# Patient Record
Sex: FEMALE | Race: WHITE | NOT HISPANIC OR LATINO | Employment: UNEMPLOYED | URBAN - NONMETROPOLITAN AREA
[De-identification: names, ages, dates, MRNs, and addresses within clinical notes are randomized per-mention and may not be internally consistent; named-entity substitution may affect disease eponyms.]

---

## 2020-10-06 ENCOUNTER — APPOINTMENT (OUTPATIENT)
Dept: CT IMAGING | Facility: HOSPITAL | Age: 70
End: 2020-10-06

## 2020-10-06 ENCOUNTER — APPOINTMENT (OUTPATIENT)
Dept: GENERAL RADIOLOGY | Facility: HOSPITAL | Age: 70
End: 2020-10-06

## 2020-10-06 ENCOUNTER — HOSPITAL ENCOUNTER (INPATIENT)
Facility: HOSPITAL | Age: 70
LOS: 2 days | Discharge: HOME | End: 2020-10-08
Attending: EMERGENCY MEDICINE | Admitting: UROLOGY

## 2020-10-06 ENCOUNTER — APPOINTMENT (OUTPATIENT)
Dept: ULTRASOUND IMAGING | Facility: HOSPITAL | Age: 70
End: 2020-10-06

## 2020-10-06 ENCOUNTER — HOSPITAL ENCOUNTER (OUTPATIENT)
Facility: HOSPITAL | Age: 70
Setting detail: HOSPITAL OUTPATIENT SURGERY
End: 2020-10-06
Attending: UROLOGY | Admitting: UROLOGY

## 2020-10-06 DIAGNOSIS — R09.02 HYPOXEMIA: ICD-10-CM

## 2020-10-06 DIAGNOSIS — N28.9 RENAL INSUFFICIENCY: ICD-10-CM

## 2020-10-06 DIAGNOSIS — D49.4 BLADDER TUMOR: Primary | ICD-10-CM

## 2020-10-06 DIAGNOSIS — J18.9 PNEUMONIA OF LEFT LOWER LOBE DUE TO INFECTIOUS ORGANISM: ICD-10-CM

## 2020-10-06 DIAGNOSIS — R31.9 HEMATURIA, UNSPECIFIED TYPE: ICD-10-CM

## 2020-10-06 DIAGNOSIS — D64.9 ANEMIA, UNSPECIFIED TYPE: ICD-10-CM

## 2020-10-06 LAB
A-A DO2: 28.5 MMHG (ref 0–300)
ABO GROUP BLD: NORMAL
ABO GROUP BLD: NORMAL
ALBUMIN SERPL-MCNC: 3.68 G/DL (ref 3.5–5.2)
ALBUMIN/GLOB SERPL: 1 G/DL
ALP SERPL-CCNC: 122 U/L (ref 39–117)
ALT SERPL W P-5'-P-CCNC: 14 U/L (ref 1–33)
ANION GAP SERPL CALCULATED.3IONS-SCNC: 10.9 MMOL/L (ref 5–15)
APTT PPP: 34 SECONDS (ref 25.6–35.3)
ARTERIAL PATENCY WRIST A: ABNORMAL
AST SERPL-CCNC: 25 U/L (ref 1–32)
ATMOSPHERIC PRESS: 731 MMHG
BACTERIA UR QL AUTO: ABNORMAL /HPF
BASE EXCESS BLDA CALC-SCNC: 5.7 MMOL/L (ref 0–2)
BASOPHILS # BLD AUTO: 0.02 10*3/MM3 (ref 0–0.2)
BASOPHILS NFR BLD AUTO: 0.2 % (ref 0–1.5)
BDY SITE: ABNORMAL
BILIRUB SERPL-MCNC: 0.2 MG/DL (ref 0–1.2)
BILIRUB UR QL STRIP: ABNORMAL
BLD GP AB SCN SERPL QL: NEGATIVE
BODY TEMPERATURE: 0 C
BUN SERPL-MCNC: 17 MG/DL (ref 8–23)
BUN/CREAT SERPL: 11.7 (ref 7–25)
CALCIUM SPEC-SCNC: 8.9 MG/DL (ref 8.6–10.5)
CHLORIDE SERPL-SCNC: 101 MMOL/L (ref 98–107)
CLARITY UR: ABNORMAL
CO2 BLDA-SCNC: 32.5 MMOL/L (ref 22–33)
CO2 SERPL-SCNC: 29.1 MMOL/L (ref 22–29)
COHGB MFR BLD: 1.3 % (ref 0–5)
COLOR UR: ABNORMAL
CREAT SERPL-MCNC: 1.45 MG/DL (ref 0.57–1)
D-LACTATE SERPL-SCNC: 0.7 MMOL/L (ref 0.5–2)
DEPRECATED RDW RBC AUTO: 48 FL (ref 37–54)
EOSINOPHIL # BLD AUTO: 0.01 10*3/MM3 (ref 0–0.4)
EOSINOPHIL NFR BLD AUTO: 0.1 % (ref 0.3–6.2)
ERYTHROCYTE [DISTWIDTH] IN BLOOD BY AUTOMATED COUNT: 15.6 % (ref 12.3–15.4)
GFR SERPL CREATININE-BSD FRML MDRD: 36 ML/MIN/1.73
GLOBULIN UR ELPH-MCNC: 3.6 GM/DL
GLUCOSE SERPL-MCNC: 170 MG/DL (ref 65–99)
GLUCOSE UR STRIP-MCNC: NEGATIVE MG/DL
HCO3 BLDA-SCNC: 31 MMOL/L (ref 20–26)
HCT VFR BLD AUTO: 32.4 % (ref 34–46.6)
HCT VFR BLD CALC: 27.8 % (ref 38–51)
HGB BLD-MCNC: 9.4 G/DL (ref 12–15.9)
HGB BLDA-MCNC: 9.1 G/DL (ref 13.5–17.5)
HGB UR QL STRIP.AUTO: ABNORMAL
HOLD SPECIMEN: NORMAL
HOLD SPECIMEN: NORMAL
HYALINE CASTS UR QL AUTO: ABNORMAL /LPF
IMM GRANULOCYTES # BLD AUTO: 0.12 10*3/MM3 (ref 0–0.05)
IMM GRANULOCYTES NFR BLD AUTO: 1 % (ref 0–0.5)
INHALED O2 CONCENTRATION: 21 %
INR PPP: 1.05 (ref 0.9–1.1)
KETONES UR QL STRIP: NEGATIVE
LEUKOCYTE ESTERASE UR QL STRIP.AUTO: ABNORMAL
LYMPHOCYTES # BLD AUTO: 1.77 10*3/MM3 (ref 0.7–3.1)
LYMPHOCYTES NFR BLD AUTO: 15 % (ref 19.6–45.3)
Lab: ABNORMAL
MCH RBC QN AUTO: 24.4 PG (ref 26.6–33)
MCHC RBC AUTO-ENTMCNC: 29 G/DL (ref 31.5–35.7)
MCV RBC AUTO: 84.2 FL (ref 79–97)
METHGB BLD QL: 0.4 % (ref 0–3)
MODALITY: ABNORMAL
MONOCYTES # BLD AUTO: 0.77 10*3/MM3 (ref 0.1–0.9)
MONOCYTES NFR BLD AUTO: 6.5 % (ref 5–12)
NEUTROPHILS NFR BLD AUTO: 77.2 % (ref 42.7–76)
NEUTROPHILS NFR BLD AUTO: 9.14 10*3/MM3 (ref 1.7–7)
NITRITE UR QL STRIP: NEGATIVE
NOTE: ABNORMAL
NRBC BLD AUTO-RTO: 0 /100 WBC (ref 0–0.2)
NT-PROBNP SERPL-MCNC: 250.7 PG/ML (ref 0–900)
OXYHGB MFR BLDV: 88.8 % (ref 94–99)
PCO2 BLDA: 48.8 MM HG (ref 35–45)
PCO2 TEMP ADJ BLD: ABNORMAL MM[HG]
PH BLDA: 7.41 PH UNITS (ref 7.35–7.45)
PH UR STRIP.AUTO: 6 [PH] (ref 5–8)
PH, TEMP CORRECTED: ABNORMAL
PLATELET # BLD AUTO: 459 10*3/MM3 (ref 140–450)
PMV BLD AUTO: 9.7 FL (ref 6–12)
PO2 BLDA: 60 MM HG (ref 83–108)
PO2 TEMP ADJ BLD: ABNORMAL MM[HG]
POTASSIUM SERPL-SCNC: 3.9 MMOL/L (ref 3.5–5.2)
PROT SERPL-MCNC: 7.3 G/DL (ref 6–8.5)
PROT UR QL STRIP: ABNORMAL
PROTHROMBIN TIME: 13.5 SECONDS (ref 11.9–14.1)
RBC # BLD AUTO: 3.85 10*6/MM3 (ref 3.77–5.28)
RBC # UR: ABNORMAL /HPF
REF LAB TEST METHOD: ABNORMAL
RH BLD: POSITIVE
RH BLD: POSITIVE
SAO2 % BLDCOA: 90.3 % (ref 94–99)
SARS-COV-2 RDRP RESP QL NAA+PROBE: NOT DETECTED
SODIUM SERPL-SCNC: 141 MMOL/L (ref 136–145)
SP GR UR STRIP: 1.02 (ref 1–1.03)
SQUAMOUS #/AREA URNS HPF: ABNORMAL /HPF
T&S EXPIRATION DATE: NORMAL
TRANS CELLS #/AREA URNS HPF: ABNORMAL /HPF
TROPONIN T SERPL-MCNC: <0.01 NG/ML (ref 0–0.03)
UROBILINOGEN UR QL STRIP: ABNORMAL
VENTILATOR MODE: ABNORMAL
WBC # BLD AUTO: 11.83 10*3/MM3 (ref 3.4–10.8)
WBC UR QL AUTO: ABNORMAL /HPF
WHOLE BLOOD HOLD SPECIMEN: NORMAL
WHOLE BLOOD HOLD SPECIMEN: NORMAL

## 2020-10-06 PROCEDURE — 71250 CT THORAX DX C-: CPT

## 2020-10-06 PROCEDURE — 85730 THROMBOPLASTIN TIME PARTIAL: CPT | Performed by: PHYSICIAN ASSISTANT

## 2020-10-06 PROCEDURE — 71250 CT THORAX DX C-: CPT | Performed by: RADIOLOGY

## 2020-10-06 PROCEDURE — 80053 COMPREHEN METABOLIC PANEL: CPT | Performed by: PHYSICIAN ASSISTANT

## 2020-10-06 PROCEDURE — 36600 WITHDRAWAL OF ARTERIAL BLOOD: CPT

## 2020-10-06 PROCEDURE — 85025 COMPLETE CBC W/AUTO DIFF WBC: CPT | Performed by: PHYSICIAN ASSISTANT

## 2020-10-06 PROCEDURE — 83605 ASSAY OF LACTIC ACID: CPT | Performed by: PHYSICIAN ASSISTANT

## 2020-10-06 PROCEDURE — 87040 BLOOD CULTURE FOR BACTERIA: CPT | Performed by: PHYSICIAN ASSISTANT

## 2020-10-06 PROCEDURE — 99223 1ST HOSP IP/OBS HIGH 75: CPT | Performed by: INTERNAL MEDICINE

## 2020-10-06 PROCEDURE — 71045 X-RAY EXAM CHEST 1 VIEW: CPT

## 2020-10-06 PROCEDURE — 84484 ASSAY OF TROPONIN QUANT: CPT | Performed by: PHYSICIAN ASSISTANT

## 2020-10-06 PROCEDURE — 25010000002 CEFTRIAXONE PER 250 MG: Performed by: PHYSICIAN ASSISTANT

## 2020-10-06 PROCEDURE — 74176 CT ABD & PELVIS W/O CONTRAST: CPT | Performed by: RADIOLOGY

## 2020-10-06 PROCEDURE — 86850 RBC ANTIBODY SCREEN: CPT | Performed by: PHYSICIAN ASSISTANT

## 2020-10-06 PROCEDURE — 71045 X-RAY EXAM CHEST 1 VIEW: CPT | Performed by: RADIOLOGY

## 2020-10-06 PROCEDURE — 81001 URINALYSIS AUTO W/SCOPE: CPT | Performed by: PHYSICIAN ASSISTANT

## 2020-10-06 PROCEDURE — 86900 BLOOD TYPING SEROLOGIC ABO: CPT

## 2020-10-06 PROCEDURE — 83880 ASSAY OF NATRIURETIC PEPTIDE: CPT | Performed by: PHYSICIAN ASSISTANT

## 2020-10-06 PROCEDURE — 76856 US EXAM PELVIC COMPLETE: CPT | Performed by: RADIOLOGY

## 2020-10-06 PROCEDURE — 99285 EMERGENCY DEPT VISIT HI MDM: CPT

## 2020-10-06 PROCEDURE — 25010000002 AZITHROMYCIN PER 500 MG: Performed by: PHYSICIAN ASSISTANT

## 2020-10-06 PROCEDURE — 87899 AGENT NOS ASSAY W/OPTIC: CPT | Performed by: INTERNAL MEDICINE

## 2020-10-06 PROCEDURE — 86900 BLOOD TYPING SEROLOGIC ABO: CPT | Performed by: PHYSICIAN ASSISTANT

## 2020-10-06 PROCEDURE — 93005 ELECTROCARDIOGRAM TRACING: CPT | Performed by: PHYSICIAN ASSISTANT

## 2020-10-06 PROCEDURE — 87635 SARS-COV-2 COVID-19 AMP PRB: CPT | Performed by: PHYSICIAN ASSISTANT

## 2020-10-06 PROCEDURE — 82805 BLOOD GASES W/O2 SATURATION: CPT

## 2020-10-06 PROCEDURE — 86901 BLOOD TYPING SEROLOGIC RH(D): CPT | Performed by: PHYSICIAN ASSISTANT

## 2020-10-06 PROCEDURE — 82375 ASSAY CARBOXYHB QUANT: CPT

## 2020-10-06 PROCEDURE — 76856 US EXAM PELVIC COMPLETE: CPT

## 2020-10-06 PROCEDURE — 83050 HGB METHEMOGLOBIN QUAN: CPT

## 2020-10-06 PROCEDURE — 74176 CT ABD & PELVIS W/O CONTRAST: CPT

## 2020-10-06 PROCEDURE — 86901 BLOOD TYPING SEROLOGIC RH(D): CPT

## 2020-10-06 PROCEDURE — 85610 PROTHROMBIN TIME: CPT | Performed by: PHYSICIAN ASSISTANT

## 2020-10-06 PROCEDURE — 93010 ELECTROCARDIOGRAM REPORT: CPT | Performed by: INTERNAL MEDICINE

## 2020-10-06 RX ORDER — SODIUM CHLORIDE 0.9 % (FLUSH) 0.9 %
10 SYRINGE (ML) INJECTION EVERY 12 HOURS SCHEDULED
Status: DISCONTINUED | OUTPATIENT
Start: 2020-10-06 | End: 2020-10-08 | Stop reason: HOSPADM

## 2020-10-06 RX ORDER — DEXTROAMPHETAMINE SACCHARATE, AMPHETAMINE ASPARTATE, DEXTROAMPHETAMINE SULFATE AND AMPHETAMINE SULFATE 7.5; 7.5; 7.5; 7.5 MG/1; MG/1; MG/1; MG/1
30 TABLET ORAL EVERY MORNING
COMMUNITY

## 2020-10-06 RX ORDER — SODIUM CHLORIDE 0.9 % (FLUSH) 0.9 %
10 SYRINGE (ML) INJECTION AS NEEDED
Status: DISCONTINUED | OUTPATIENT
Start: 2020-10-06 | End: 2020-10-08 | Stop reason: HOSPADM

## 2020-10-06 RX ORDER — ZOLPIDEM TARTRATE 5 MG/1
10 TABLET ORAL NIGHTLY
Status: CANCELLED | OUTPATIENT
Start: 2020-10-06

## 2020-10-06 RX ORDER — GABAPENTIN 100 MG/1
100 CAPSULE ORAL EVERY 12 HOURS SCHEDULED
Status: DISCONTINUED | OUTPATIENT
Start: 2020-10-06 | End: 2020-10-08 | Stop reason: HOSPADM

## 2020-10-06 RX ORDER — ZOLPIDEM TARTRATE 5 MG/1
5 TABLET ORAL NIGHTLY
Status: DISCONTINUED | OUTPATIENT
Start: 2020-10-06 | End: 2020-10-08 | Stop reason: HOSPADM

## 2020-10-06 RX ORDER — BISOPROLOL FUMARATE AND HYDROCHLOROTHIAZIDE 2.5; 6.25 MG/1; MG/1
1 TABLET ORAL DAILY
COMMUNITY

## 2020-10-06 RX ORDER — PREGABALIN 100 MG/1
200 CAPSULE ORAL EVERY 12 HOURS SCHEDULED
Status: CANCELLED | OUTPATIENT
Start: 2020-10-06

## 2020-10-06 RX ORDER — PREGABALIN 200 MG/1
200 CAPSULE ORAL 2 TIMES DAILY
COMMUNITY
End: 2020-10-08 | Stop reason: HOSPADM

## 2020-10-06 RX ORDER — DEXTROAMPHETAMINE SACCHARATE, AMPHETAMINE ASPARTATE, DEXTROAMPHETAMINE SULFATE AND AMPHETAMINE SULFATE 1.25; 1.25; 1.25; 1.25 MG/1; MG/1; MG/1; MG/1
30 TABLET ORAL EVERY MORNING
Status: DISCONTINUED | OUTPATIENT
Start: 2020-10-07 | End: 2020-10-08 | Stop reason: HOSPADM

## 2020-10-06 RX ORDER — ZOLPIDEM TARTRATE 10 MG/1
10 TABLET ORAL NIGHTLY
COMMUNITY

## 2020-10-06 RX ADMIN — AZITHROMYCIN MONOHYDRATE 500 MG: 500 INJECTION, POWDER, LYOPHILIZED, FOR SOLUTION INTRAVENOUS at 13:18

## 2020-10-06 RX ADMIN — ZOLPIDEM TARTRATE 5 MG: 5 TABLET ORAL at 21:47

## 2020-10-06 RX ADMIN — SODIUM CHLORIDE 2 G: 900 INJECTION INTRAVENOUS at 12:04

## 2020-10-06 NOTE — H&P
[facility]    Patient Identification:  Name:  Rosaura Andujar  Age:  70 y.o.  Sex:  female  :  1950  MRN:  [id number]   Visit Number:  [id number]  Room number:  110/10  Admit Date: 10/6/2020   Primary Care Physician:  Leeann Gaytan APRN     Chief complaint:    Chief Complaint   Patient presents with   • Blood in Urine       History of presenting illness:  70 y.o. female with no significant past medical history except of neuropathy, presented to emergency department after being referred from her gynecologist when she was evaluated for vaginal bleed.  The patient apparently has been complaining of hematuria and not a true vaginal bleed discharge and the patient has been having the symptoms for the past 4 months however over the past week it is becoming more persistent, it is painless, sometimes she feels right flank pain.  Apart from that the patient was having some dyspnea on exertion.  At her arrival to emergency department her pulse ox was in the 88% on room air.  The patient has been evaluated by her gynecologist with pelvic ultrasound that was unrevealing, she had a CT scan of the chest and pelvis and abdomen in the ER where she was found to have a bladder tumor causing obstructing in the left ureter causing hydroureter/hydronephrosis as well as infiltrate in the left lung.  She was started on IV antibiotics, consult with urology has been placed by the ER provider.  Medical admission was requested.    ---------------------------------------------------------------------------------------------------------------------   Review of Systems unremarkable except was mentioned above  ---------------------------------------------------------------------------------------------------------------------   History reviewed. No pertinent past medical history.  History reviewed. No pertinent surgical history.  History reviewed. No pertinent family history.  Social  History     Socioeconomic History   • Marital status:      Spouse name: Not on file   • Number of children: Not on file   • Years of education: Not on file   • Highest education level: Not on file   Tobacco Use   • Smoking status: Never Smoker   Substance and Sexual Activity   • Alcohol use: Not Currently   • Drug use: Never     ---------------------------------------------------------------------------------------------------------------------   Allergies:  Sulfa antibiotics and Tetracyclines & related  ---------------------------------------------------------------------------------------------------------------------   Prior to Admission Medications     None        ---------------------------------------------------------------------------------------------------------------------   Vital Signs:  Temp:  [98.9 °F (37.2 °C)] 98.9 °F (37.2 °C)  Heart Rate:  [83-98] 96  Resp:  [20] 20  BP: (106-136)/(55-96) 120/68    Mean Arterial Pressure (Non-Invasive) for the past 24 hrs (Last 3 readings):   Noninvasive MAP (mmHg)   10/06/20 1404 90   10/06/20 1353 97   10/06/20 1233 110     SpO2:  [91 %-98 %] 98 %  on  Flow (L/min):  [2] 2;   Device (Oxygen Therapy): nasal cannula  Body mass index is 29.86 kg/m².    Wt Readings from Last 3 Encounters:   10/06/20 83.9 kg (185 lb)               ---------------------------------------------------------------------------------------------------------------------   Physical Exam:  Constitutional:  Well-developed and well-nourished.  Moderate respiratory distress especially when speaking full sentences   HENT:  Head: Normocephalic and atraumatic.  Mouth:  Moist mucous membranes.    Eyes:  Conjunctivae and EOM are normal.  Pupils are equal, round, and reactive to light.  No scleral icterus.  Neck:  Neck supple.  No JVD present.    Cardiovascular:  Normal rate, regular rhythm and normal heart sounds with no murmur.  Pulmonary/Chest:  N reduced air entry at the bases without any  other added sounds, normal chest expansion.  Abdominal:  Soft.  Bowel sounds are normal.  No distension and no tenderness.   Musculoskeletal:  No edema, no tenderness, and no deformity.  No red or swollen joints anywhere.    Neurological:  Alert and oriented to person, place, and time.  No cranial nerve deficit.  No tongue deviation.  No facial droop.  No slurred speech.   Skin:  Skin is warm and dry.  No rash noted.  No pallor.   Peripheral vascular:  No edema and strong pulses on all 4 extremities.      I have personally looked at both the EKG and the telemetry strips.  --------------------------------------------------------------------------------------------------------------------  Results from last 7 days   Lab Units 10/06/20  0852   TROPONIN T ng/mL <0.010     Results from last 7 days   Lab Units 10/06/20  1159 10/06/20  0852   LACTATE mmol/L 0.7  --    WBC 10*3/mm3  --  11.83*   HEMOGLOBIN g/dL  --  9.4*   HEMATOCRIT %  --  32.4*   MCV fL  --  84.2   MCHC g/dL  --  29.0*   PLATELETS 10*3/mm3  --  459*   INR   --  1.05     Results from last 7 days   Lab Units 10/06/20  0852   SODIUM mmol/L 141   POTASSIUM mmol/L 3.9   CHLORIDE mmol/L 101   CO2 mmol/L 29.1*   BUN mg/dL 17   CREATININE mg/dL 1.45*   EGFR IF NONAFRICN AM mL/min/1.73 36*   CALCIUM mg/dL 8.9   GLUCOSE mg/dL 170*   ALBUMIN g/dL 3.68   BILIRUBIN mg/dL 0.2   ALK PHOS U/L 122*   AST (SGOT) U/L 25   ALT (SGPT) U/L 14   Estimated Creatinine Clearance: 40.5 mL/min (A) (by C-G formula based on SCr of 1.45 mg/dL (H)).    No results found for: HGBA1C, POCGLU  No results found for: AMMONIA    Results from last 7 days   Lab Units 10/06/20  0940   NITRITE UA  Negative   WBC UA /HPF 3-5*   BACTERIA UA /HPF 1+*   SQUAM EPITHEL UA /HPF 0-2     No results found for: BLOODCXNo results found for: RESPCXNo results found for: URINECXNo results found for: WOUNDCXNo results found for: BODYFLDCXNo results found for: STOOLCX  pH pH, Arterial   Date Value Ref Range Status    10/06/2020 7.412 7.350 - 7.450 pH units Final      pO2 pO2, Arterial   Date Value Ref Range Status   10/06/2020 60.0 (L) 83.0 - 108.0 mm Hg Final     Comment:     84 Value below reference range      pCO2 pCO2, Arterial   Date Value Ref Range Status   10/06/2020 48.8 (H) 35.0 - 45.0 mm Hg Final      HCO3 HCO3, Arterial   Date Value Ref Range Status   10/06/2020 31.0 (H) 20.0 - 26.0 mmol/L Final     Comment:     83 Value above reference range        I have personally looked at the labs and they are summarized above.  ----------------------------------------------------------------------------------------------------------------------  Imaging Results (Last 24 Hours)     Procedure Component Value Units Date/Time    CT Abdomen Pelvis Without Contrast [[id number]] Collected: 10/06/20 1125     Updated: 10/06/20 1133    Narrative:      EXAM: CT ABDOMEN PELVIS WO CONTRAST-            TECHNIQUE: Multiple axial CT images were obtained from lung bases  through pubic symphysis WITHOUT administration of IV contrast.  Reformatted images in the coronal and/or sagittal plane(s) were  generated from the axial data set to facilitate diagnostic accuracy  and/or surgical planning.  Oral Contrast:NONE.     Radiation dose reduction techniques were utilized per ALARA protocol.  Automated exposure control was initiated through either or [employer] or  [employer]ose[employer] software packages by  protocol.    DOSE:     Clinical information abd pain, hematuria      Comparison NONE.     FINDINGS:     Lower thorax: Parenchymal nodule in the left lung base on image 4  measuring 9 mm  6 mm parenchymal nodule in the right lung base  Large hiatal hernia     Abdomen:     Liver: Low-attenuation lesion in the caudate lobe of the liver     Gallbladder: Surgically absent     Pancreas: Unremarkable. No mass or ductal dilatation.     Spleen: Homogeneous. No splenomegaly.     Adrenals: No mass.     Kidneys/ureters: Left-sided hydronephrosis and hydroureter  to the level  of the urinary bladder where there appears to be a urinary bladder mass  extending into the distal left ureter.     GI tract: Moderate to large volume stool in the colon     Peritoneum: No free air. No free fluid or loculated fluid collections.     Mesentery: Unremarkable.     Lymph nodes: No lymphadenopathy.     Vasculature: No evidence of aneurysm.     Abdominal wall: No focal hernia or mass.        Pelvis:     Bladder: Abnormal urinary bladder mass which appears to also involve the  distal left ureter.     Reproductive: Unremarkable as visualized.     Appendix: No evidence of appendicitis     Bones: Scoliosis and arthritic change       Impression:         1. Urinary bladder mass and it appears to extend into the distal left  ureter. Recommend urologic follow-up  2. Low-attenuation lesion in the liver and nodules in the lungs. These  are concerning for metastatic disease  3. Other findings as above                 This report was finalized on 10/6/2020 11:29 AM by Dr. Barry Batista MD.       CT Chest Without Contrast [[id number]] Collected: 10/06/20 1129     Updated: 10/06/20 1133    Narrative:      EXAM: CT CHEST WO CONTRAST-            CLINICAL INDICATION:sob      COMPARISON: Chest x-ray 10/06/2020     TECHNIQUE: Multiple axial CT images were obtained from lung apex through  upper abdomen without administration of IV contrast. Reformatted images  in the coronal and/or sagittal plane(s) were generated from the axial  data set to facilitate diagnostic accuracy and/or surgical planning.     Radiation dose reduction techniques were utilized per ALARA protocol.  Automated exposure control was initiated through either or [employer] or  Dose[employer]ig[employer] software packages by  protocol.    DOSE (DLP mGy-cm):        FINDINGS:     Lungs: There are parenchymal nodules bilaterally concerning for  metastatic disease.  In addition, there are few groundglass opacities, predominantly in the  left lung, most  likely concomitant pneumonia  Heart: Unremarkable.  Pericardium: No effusion.  Mediastinum: No masses. No enlarged lymph nodes.  No fluid collections.  Pleura: No pleural effusion. No pleural mass or abnormal calcification.  No pneumothorax.  Major airways: Clear. No intrinsic mass.  Vasculature: No evidence of aneurysm.   Visualized upper abdomen:Large hiatal hernia  Other: None.  Bones: No acute bony abnormality.       Impression:         1. Parenchymal nodules bilaterally concerning for metastatic disease.  Largest nodule is in the left upper lobe and measures 1.59 cm.  2. Few groundglass opacities primarily in the left lung but concerning  for concomitant pneumonia  3. Other findings as above        This report was finalized on 10/6/2020 11:31 AM by Dr. Barry Batista MD.       XR Chest 1 View [[id number]] Collected: 10/06/20 1043     Updated: 10/06/20 1046    Narrative:      XR CHEST 1 VW-     CLINICAL INDICATION: fatigue        COMPARISON: None available      TECHNIQUE: Single frontal view of the chest.     FINDINGS:     Left-sided consolidation concerning for pneumonia  The cardiac silhouette is normal. The pulmonary vasculature is  unremarkable.  There is no evidence of an acute osseous abnormality.   There are no suspicious-appearing parenchymal soft tissue nodules.          Impression:      Left-sided consolidation concerning for pneumonia     This report was finalized on 10/6/2020 10:44 AM by Dr. Barry Batista MD.       US Pelvis Complete [[id number]] Collected: 10/06/20 0940     Updated: 10/06/20 0943    Narrative:      EXAMINATION: US PELVIS COMPLETE-      CLINICAL INDICATION: pain        COMPARISON: None available     FINDINGS: Transabdominal sonographic imaging of the pelvis shows uterus  measuring 9.30 x 3.21 cm.  Endometrium is 5.8 mm.  No complex uterine mass     There is what appears to be a mass within the bladder measuring 4.3 cm.  I would suggest cystoscopy.       Impression:      1. No complex  uterine mass. Right ovary unable to be visualized. Left  ovary unremarkable.  2. Echogenic material within the urinary bladder. Recommend urologic  follow-up      This report was finalized on 10/6/2020 9:41 AM by Dr. Barry Batista MD.           I have personally reviewed the radiology images and read the final radiology report.  ----------------------------------------------------------------------------------------------------------------------        Assessment and Plan:    *Mild hypoxia at initial presentation, down to 88% on room air, suspected due to CAP  *CAP in the left upper lobe, negative COVID-19  *Painless hematuria with bladder tumor suspicious of bladder malignancy with possible metastasis to the liver and lungs  *Hydronephrosis and hydroureter due to above  *Mild leukocytosis due to above  *History of neuropathy, patient takes Lyrica 400 mg a day  *Renal insufficiency, possibly obstructive uropathy due to above    --MedSurg admission  --For the pneumonia we will start the patient on ceftriaxone and azithromycin, will continue the current therapy and follow-up on the culture sent by ER, pneumococcal urine antigen will be checked  --SCDs for DVT prophylaxis  --Urology consult has been obtained in the ER, patient eventually will need cystoscopy and biopsy.   --With current examination and going to hold on IV fluids/diuretics although patient seems to have mild third spacing.  --CBC and BMP monitoring         Mhd Fer Romero MD  10/06/20  14:37 EDT

## 2020-10-06 NOTE — ED PROVIDER NOTES
Subjective   70-year-old female presents secondary to 4-month history of blood in her urine.  She denies any vaginal bleeding.  She states that she is also had some discomfort in her lower abdomen into her vaginal region.  She states this is worse when she standing and walking.  She states that she had not been seen other than urgent care and has not had any type of work-up.  No weight loss.  Patient is eating and drinking well.  She voices no other complaints at this time.          Review of Systems   Constitutional: Negative.  Negative for fever.   HENT: Negative.    Respiratory: Negative.    Cardiovascular: Negative.  Negative for chest pain.   Gastrointestinal: Positive for abdominal pain.   Endocrine: Negative.    Genitourinary: Positive for hematuria. Negative for dysuria and frequency.   Skin: Negative.    Neurological: Negative.    Psychiatric/Behavioral: Negative.    All other systems reviewed and are negative.      History reviewed. No pertinent past medical history.    Allergies   Allergen Reactions   • Sulfa Antibiotics Anaphylaxis   • Tetracyclines & Related Anaphylaxis       History reviewed. No pertinent surgical history.    History reviewed. No pertinent family history.    Social History     Socioeconomic History   • Marital status:      Spouse name: Not on file   • Number of children: Not on file   • Years of education: Not on file   • Highest education level: Not on file   Tobacco Use   • Smoking status: Never Smoker   Substance and Sexual Activity   • Alcohol use: Not Currently   • Drug use: Never           Objective   Physical Exam  Vitals signs and nursing note reviewed.   Constitutional:       General: She is not in acute distress.     Appearance: She is well-developed. She is not diaphoretic.   HENT:      Head: Normocephalic and atraumatic.      Right Ear: External ear normal.      Left Ear: External ear normal.      Nose: Nose normal.   Eyes:      Conjunctiva/sclera: Conjunctivae  normal.      Pupils: Pupils are equal, round, and reactive to light.   Neck:      Musculoskeletal: Normal range of motion and neck supple.      Vascular: No JVD.      Trachea: No tracheal deviation.   Cardiovascular:      Rate and Rhythm: Normal rate and regular rhythm.      Heart sounds: Normal heart sounds. No murmur.   Pulmonary:      Effort: Pulmonary effort is normal. No respiratory distress.      Breath sounds: Normal breath sounds. No wheezing.   Abdominal:      General: Bowel sounds are normal.      Palpations: Abdomen is soft.      Tenderness: There is no abdominal tenderness.   Genitourinary:     General: Normal vulva.      Comments: No vaginal bleeding.  Musculoskeletal: Normal range of motion.         General: No deformity.   Skin:     General: Skin is warm and dry.      Coloration: Skin is not pale.      Findings: No erythema or rash.   Neurological:      Mental Status: She is alert and oriented to person, place, and time.      Cranial Nerves: No cranial nerve deficit.   Psychiatric:         Behavior: Behavior normal.         Thought Content: Thought content normal.         Procedures           ED Course  ED Course as of Oct 06 1408   Tue Oct 06, 2020   1139 D/w Dr Sabillon- NPO after midnight. Will consult.     [JI]   1140 Paged the hospitalist.    [JI]   1205 Accepted by Dr Romero    [JI]      ED Course User Index  [JI] Luis Carlos Moreno PA                                           MDM  Number of Diagnoses or Management Options  Anemia, unspecified type: new and requires workup  Bladder tumor: new and requires workup  Hematuria, unspecified type: new and requires workup  Hypoxemia: new and requires workup  Pneumonia of left lower lobe due to infectious organism: new and requires workup  Renal insufficiency: new and requires workup     Amount and/or Complexity of Data Reviewed  Clinical lab tests: reviewed and ordered  Tests in the radiology section of CPT®: reviewed and ordered  Tests in the medicine  section of CPT®: reviewed and ordered        Final diagnoses:   Bladder tumor   Hematuria, unspecified type   Renal insufficiency   Anemia, unspecified type   Hypoxemia   Pneumonia of left lower lobe due to infectious organism            Luis Carlos Moreno PA  10/06/20 1402

## 2020-10-07 ENCOUNTER — ANESTHESIA EVENT (OUTPATIENT)
Dept: PERIOP | Facility: HOSPITAL | Age: 70
End: 2020-10-07

## 2020-10-07 ENCOUNTER — ANESTHESIA (OUTPATIENT)
Dept: PERIOP | Facility: HOSPITAL | Age: 70
End: 2020-10-07

## 2020-10-07 ENCOUNTER — APPOINTMENT (OUTPATIENT)
Dept: GENERAL RADIOLOGY | Facility: HOSPITAL | Age: 70
End: 2020-10-07

## 2020-10-07 LAB
ANION GAP SERPL CALCULATED.3IONS-SCNC: 10.8 MMOL/L (ref 5–15)
BUN SERPL-MCNC: 15 MG/DL (ref 8–23)
BUN/CREAT SERPL: 12 (ref 7–25)
CALCIUM SPEC-SCNC: 8.4 MG/DL (ref 8.6–10.5)
CHLORIDE SERPL-SCNC: 103 MMOL/L (ref 98–107)
CO2 SERPL-SCNC: 24.2 MMOL/L (ref 22–29)
CREAT SERPL-MCNC: 1.25 MG/DL (ref 0.57–1)
DEPRECATED RDW RBC AUTO: 47.2 FL (ref 37–54)
ERYTHROCYTE [DISTWIDTH] IN BLOOD BY AUTOMATED COUNT: 15.5 % (ref 12.3–15.4)
GFR SERPL CREATININE-BSD FRML MDRD: 43 ML/MIN/1.73
GLUCOSE SERPL-MCNC: 99 MG/DL (ref 65–99)
HCT VFR BLD AUTO: 27 % (ref 34–46.6)
HGB BLD-MCNC: 8.1 G/DL (ref 12–15.9)
IRON 24H UR-MRATE: 19 MCG/DL (ref 37–145)
IRON SATN MFR SERPL: 6 % (ref 20–50)
MCH RBC QN AUTO: 25.1 PG (ref 26.6–33)
MCHC RBC AUTO-ENTMCNC: 30 G/DL (ref 31.5–35.7)
MCV RBC AUTO: 83.6 FL (ref 79–97)
PLATELET # BLD AUTO: 278 10*3/MM3 (ref 140–450)
PMV BLD AUTO: 9.8 FL (ref 6–12)
POTASSIUM SERPL-SCNC: 3.8 MMOL/L (ref 3.5–5.2)
RBC # BLD AUTO: 3.23 10*6/MM3 (ref 3.77–5.28)
S PNEUM AG SPEC QL LA: NEGATIVE
SODIUM SERPL-SCNC: 138 MMOL/L (ref 136–145)
TIBC SERPL-MCNC: 307 MCG/DL (ref 298–536)
TRANSFERRIN SERPL-MCNC: 206 MG/DL (ref 200–360)
WBC # BLD AUTO: 7.69 10*3/MM3 (ref 3.4–10.8)

## 2020-10-07 PROCEDURE — 52235 CYSTOSCOPY AND TREATMENT: CPT | Performed by: UROLOGY

## 2020-10-07 PROCEDURE — 84466 ASSAY OF TRANSFERRIN: CPT | Performed by: INTERNAL MEDICINE

## 2020-10-07 PROCEDURE — 99232 SBSQ HOSP IP/OBS MODERATE 35: CPT | Performed by: INTERNAL MEDICINE

## 2020-10-07 PROCEDURE — 0TBB8ZZ EXCISION OF BLADDER, VIA NATURAL OR ARTIFICIAL OPENING ENDOSCOPIC: ICD-10-PCS | Performed by: UROLOGY

## 2020-10-07 PROCEDURE — 25010000002 GENTAMICIN PER 80 MG: Performed by: UROLOGY

## 2020-10-07 PROCEDURE — 25010000002 FENTANYL CITRATE (PF) 100 MCG/2ML SOLUTION: Performed by: NURSE ANESTHETIST, CERTIFIED REGISTERED

## 2020-10-07 PROCEDURE — 25010000002 PROPOFOL 10 MG/ML EMULSION: Performed by: NURSE ANESTHETIST, CERTIFIED REGISTERED

## 2020-10-07 PROCEDURE — 85027 COMPLETE CBC AUTOMATED: CPT | Performed by: INTERNAL MEDICINE

## 2020-10-07 PROCEDURE — 80048 BASIC METABOLIC PNL TOTAL CA: CPT | Performed by: INTERNAL MEDICINE

## 2020-10-07 PROCEDURE — 88307 TISSUE EXAM BY PATHOLOGIST: CPT | Performed by: UROLOGY

## 2020-10-07 PROCEDURE — 83540 ASSAY OF IRON: CPT | Performed by: INTERNAL MEDICINE

## 2020-10-07 PROCEDURE — 25010000002 AZITHROMYCIN PER 500 MG: Performed by: UROLOGY

## 2020-10-07 RX ORDER — MAGNESIUM HYDROXIDE 1200 MG/15ML
3000 LIQUID ORAL CONTINUOUS
Status: DISCONTINUED | OUTPATIENT
Start: 2020-10-07 | End: 2020-10-08

## 2020-10-07 RX ORDER — MAGNESIUM HYDROXIDE 1200 MG/15ML
LIQUID ORAL AS NEEDED
Status: DISCONTINUED | OUTPATIENT
Start: 2020-10-07 | End: 2020-10-07 | Stop reason: HOSPADM

## 2020-10-07 RX ORDER — FENTANYL CITRATE 50 UG/ML
INJECTION, SOLUTION INTRAMUSCULAR; INTRAVENOUS AS NEEDED
Status: DISCONTINUED | OUTPATIENT
Start: 2020-10-07 | End: 2020-10-07 | Stop reason: SURG

## 2020-10-07 RX ORDER — SODIUM CHLORIDE, SODIUM LACTATE, POTASSIUM CHLORIDE, CALCIUM CHLORIDE 600; 310; 30; 20 MG/100ML; MG/100ML; MG/100ML; MG/100ML
125 INJECTION, SOLUTION INTRAVENOUS CONTINUOUS
Status: DISCONTINUED | OUTPATIENT
Start: 2020-10-07 | End: 2020-10-08

## 2020-10-07 RX ORDER — IPRATROPIUM BROMIDE AND ALBUTEROL SULFATE 2.5; .5 MG/3ML; MG/3ML
3 SOLUTION RESPIRATORY (INHALATION) ONCE AS NEEDED
Status: DISCONTINUED | OUTPATIENT
Start: 2020-10-07 | End: 2020-10-07 | Stop reason: HOSPADM

## 2020-10-07 RX ORDER — SODIUM CHLORIDE 0.9 % (FLUSH) 0.9 %
10 SYRINGE (ML) INJECTION AS NEEDED
Status: DISCONTINUED | OUTPATIENT
Start: 2020-10-07 | End: 2020-10-07 | Stop reason: HOSPADM

## 2020-10-07 RX ORDER — MIDAZOLAM HYDROCHLORIDE 1 MG/ML
1 INJECTION INTRAMUSCULAR; INTRAVENOUS
Status: DISCONTINUED | OUTPATIENT
Start: 2020-10-07 | End: 2020-10-07 | Stop reason: HOSPADM

## 2020-10-07 RX ORDER — OXYCODONE HYDROCHLORIDE AND ACETAMINOPHEN 5; 325 MG/1; MG/1
1 TABLET ORAL ONCE AS NEEDED
Status: DISCONTINUED | OUTPATIENT
Start: 2020-10-07 | End: 2020-10-07 | Stop reason: HOSPADM

## 2020-10-07 RX ORDER — GENTAMICIN SULFATE 80 MG/100ML
80 INJECTION, SOLUTION INTRAVENOUS ONCE
Status: COMPLETED | OUTPATIENT
Start: 2020-10-07 | End: 2020-10-07

## 2020-10-07 RX ORDER — L.ACID,PARA/B.BIFIDUM/S.THERM 8B CELL
1 CAPSULE ORAL DAILY
Status: DISCONTINUED | OUTPATIENT
Start: 2020-10-07 | End: 2020-10-08 | Stop reason: HOSPADM

## 2020-10-07 RX ORDER — ATROPA BELLADONNA AND OPIUM 16.2; 3 MG/1; MG/1
SUPPOSITORY RECTAL AS NEEDED
Status: DISCONTINUED | OUTPATIENT
Start: 2020-10-07 | End: 2020-10-07 | Stop reason: HOSPADM

## 2020-10-07 RX ORDER — FENTANYL CITRATE 50 UG/ML
50 INJECTION, SOLUTION INTRAMUSCULAR; INTRAVENOUS
Status: DISCONTINUED | OUTPATIENT
Start: 2020-10-07 | End: 2020-10-07 | Stop reason: HOSPADM

## 2020-10-07 RX ORDER — MEPERIDINE HYDROCHLORIDE 25 MG/ML
12.5 INJECTION INTRAMUSCULAR; INTRAVENOUS; SUBCUTANEOUS
Status: DISCONTINUED | OUTPATIENT
Start: 2020-10-07 | End: 2020-10-07 | Stop reason: HOSPADM

## 2020-10-07 RX ORDER — ONDANSETRON 2 MG/ML
4 INJECTION INTRAMUSCULAR; INTRAVENOUS AS NEEDED
Status: DISCONTINUED | OUTPATIENT
Start: 2020-10-07 | End: 2020-10-07 | Stop reason: HOSPADM

## 2020-10-07 RX ORDER — PROPOFOL 10 MG/ML
VIAL (ML) INTRAVENOUS AS NEEDED
Status: DISCONTINUED | OUTPATIENT
Start: 2020-10-07 | End: 2020-10-07 | Stop reason: SURG

## 2020-10-07 RX ORDER — SODIUM CHLORIDE 0.9 % (FLUSH) 0.9 %
10 SYRINGE (ML) INJECTION EVERY 12 HOURS SCHEDULED
Status: DISCONTINUED | OUTPATIENT
Start: 2020-10-07 | End: 2020-10-07 | Stop reason: HOSPADM

## 2020-10-07 RX ADMIN — AZITHROMYCIN MONOHYDRATE 500 MG: 500 INJECTION, POWDER, LYOPHILIZED, FOR SOLUTION INTRAVENOUS at 17:26

## 2020-10-07 RX ADMIN — ZOLPIDEM TARTRATE 5 MG: 5 TABLET ORAL at 20:47

## 2020-10-07 RX ADMIN — FENTANYL CITRATE 50 MCG: 50 INJECTION INTRAMUSCULAR; INTRAVENOUS at 12:47

## 2020-10-07 RX ADMIN — GENTAMICIN SULFATE 80 MG: 80 INJECTION, SOLUTION INTRAVENOUS at 12:37

## 2020-10-07 RX ADMIN — SODIUM CHLORIDE, POTASSIUM CHLORIDE, SODIUM LACTATE AND CALCIUM CHLORIDE: 600; 310; 30; 20 INJECTION, SOLUTION INTRAVENOUS at 12:37

## 2020-10-07 RX ADMIN — SODIUM CHLORIDE 3000 ML: 900 IRRIGANT IRRIGATION at 18:17

## 2020-10-07 RX ADMIN — PROPOFOL 150 MG: 10 INJECTION, EMULSION INTRAVENOUS at 12:41

## 2020-10-07 RX ADMIN — FENTANYL CITRATE 50 MCG: 50 INJECTION INTRAMUSCULAR; INTRAVENOUS at 13:56

## 2020-10-07 RX ADMIN — FENTANYL CITRATE 50 MCG: 50 INJECTION INTRAMUSCULAR; INTRAVENOUS at 14:07

## 2020-10-07 RX ADMIN — FENTANYL CITRATE 50 MCG: 50 INJECTION INTRAMUSCULAR; INTRAVENOUS at 13:01

## 2020-10-07 NOTE — CONSULTS
Name:  Rosaura Andujar  :  1950    DATE OF ADMISSION  10/6/2020    DATE OF CONSULT  10/7/2020           PRIMARY CARE PHYSICIAN  Leeann Gaytan, PAULA    REASON FOR CONSULT  Gross hematuria for 4 months    CHIEF COMPLAINT  Chief Complaint   Patient presents with   • Blood in Urine       HISTORY OF PRESENT ILLNESS:   Rosaura Andujar is a 70 y.o. female who has gross painless hematuria for 4 months is seen to nurse practitioners a GYN thought to be vaginal bleeding eventually patient realized it was from her bladder.  Her brother had bladder cancer as well.  She is not smoked for many years she has no other significant risk factors.  She has significant burning she has significant anemia.  She denies other significant medical problems.    I saw Rosaura Andujar in their hospital room this morning.    PAST MEDICAL HISTORY  Past Medical History:   Diagnosis Date   • Cancer (CMS/HCC)    • Hyperlipidemia    • Hypertension        PAST SURGICAL HISTORY  Past Surgical History:   Procedure Laterality Date   • OOPHORECTOMY Right        SOCIAL HISTORY  Social History     Socioeconomic History   • Marital status:      Spouse name: Not on file   • Number of children: Not on file   • Years of education: Not on file   • Highest education level: Not on file   Tobacco Use   • Smoking status: Never Smoker   Substance and Sexual Activity   • Alcohol use: Not Currently   • Drug use: Never       FAMILY HISTORY  History reviewed. No pertinent family history.    ALLERGIES  Allergies   Allergen Reactions   • Sulfa Antibiotics Anaphylaxis   • Tetracyclines & Related Anaphylaxis       INPATIENT MEDICATIONS  Current Facility-Administered Medications   Medication Dose Route Frequency Provider Last Rate Last Dose   • amphetamine-dextroamphetamine (ADDERALL) tablet 30 mg  30 mg Oral Margret Mendez, DO       • azithromycin 500 MG/250 ML 0.9% NS IVPB (MBP)  500 mg Intravenous Q24H Gayatri Romero MD       • cefTRIAXone (ROCEPHIN)  "1 g/100 mL 0.9% NS (MBP)  1 g Intravenous Q24H Gayatri Romero MD       • gabapentin (NEURONTIN) capsule 100 mg  100 mg Oral Q12H Margret Dorsey DO       • sodium chloride 0.9 % flush 10 mL  10 mL Intravenous PRN Luis Carlos Moreno PA       • sodium chloride 0.9 % flush 10 mL  10 mL Intravenous Q12H Gayatri Romero MD       • sodium chloride 0.9 % flush 10 mL  10 mL Intravenous PRN Gayatri Romero MD       • zolpidem (AMBIEN) tablet 5 mg  5 mg Oral Nightly Margret Dorsey DO   5 mg at 10/06/20 2147       REVIEW OF SYSTEMS  CONSTITUTIONAL:  No fever, chills, night sweats or fatigue.  EYES:  No blurry vision, diplopia or other vision changes.  ENT:  No hearing loss, nosebleeds or sore throat.  CARDIOVASCULAR:  No palpitations, arrhythmia, syncopal episodes or edema.  PULMONARY:  No hemoptysis, wheezing, chronic cough or shortness of breath.  GASTROINTESTINAL:  No nausea or vomiting.  No constipation or diarrhea.  No abdominal pain.  GENITOURINARY:  No hematuria, kidney stones or frequent urination.  MUSCULOSKELETAL:  No joint or back pains.  INTEGUMENTARY: No rashes or pruritus.  ENDOCRINE:  No excessive thirst or hot flashes.  HEMATOLOGIC:  No history of free bleeding, spontaneous bleeding or clotting.  IMMUNOLOGIC:  No allergies or frequent infections.  NEUROLOGIC: No numbness, tingling, seizures or weakness.  PSYCHIATRIC:  No anxiety or depression.    PHYSICAL EXAMINATION    /55 (BP Location: Right arm, Patient Position: Lying)   Pulse 87   Temp 97.9 °F (36.6 °C) (Oral)   Resp 20   Ht 167.6 cm (66\")   Wt 131 kg (289 lb)   SpO2 93%   BMI 46.65 kg/m²     GENERAL:  A well-developed, well-nourished, white female in no acute distress.  HEENT:  Pupils equally round and reactive to light.  Extraocular muscles intact.  CARDIOVASCULAR:  Regular rate and rhythm.  No murmurs, gallops or rubs.  LUNGS:  Clear to auscultation bilaterally.  ABDOMEN:  Soft, nontender, nondistended with positive " bowel sounds.  EXTREMITIES:  No clubbing, cyanosis or edema bilaterally.  SKIN:  No rashes or petechiae.  NEURO:  Cranial nerves grossly intact.  No focal deficits.  PSYCH:  Alert and oriented x3.    LABORATORY     WBC   Date Value Ref Range Status   10/07/2020 7.69 3.40 - 10.80 10*3/mm3 Final     RBC   Date Value Ref Range Status   10/07/2020 3.23 (L) 3.77 - 5.28 10*6/mm3 Final     Hemoglobin   Date Value Ref Range Status   10/07/2020 8.1 (L) 12.0 - 15.9 g/dL Final     Hematocrit   Date Value Ref Range Status   10/07/2020 27.0 (L) 34.0 - 46.6 % Final     MCV   Date Value Ref Range Status   10/07/2020 83.6 79.0 - 97.0 fL Final     MCH   Date Value Ref Range Status   10/07/2020 25.1 (L) 26.6 - 33.0 pg Final     MCHC   Date Value Ref Range Status   10/07/2020 30.0 (L) 31.5 - 35.7 g/dL Final     RDW   Date Value Ref Range Status   10/07/2020 15.5 (H) 12.3 - 15.4 % Final     RDW-SD   Date Value Ref Range Status   10/07/2020 47.2 37.0 - 54.0 fl Final     MPV   Date Value Ref Range Status   10/07/2020 9.8 6.0 - 12.0 fL Final     Platelets   Date Value Ref Range Status   10/07/2020 278 140 - 450 10*3/mm3 Final     Neutrophil %   Date Value Ref Range Status   10/06/2020 77.2 (H) 42.7 - 76.0 % Final     Lymphocyte %   Date Value Ref Range Status   10/06/2020 15.0 (L) 19.6 - 45.3 % Final     Monocyte %   Date Value Ref Range Status   10/06/2020 6.5 5.0 - 12.0 % Final     Eosinophil %   Date Value Ref Range Status   10/06/2020 0.1 (L) 0.3 - 6.2 % Final     Basophil %   Date Value Ref Range Status   10/06/2020 0.2 0.0 - 1.5 % Final     Immature Grans %   Date Value Ref Range Status   10/06/2020 1.0 (H) 0.0 - 0.5 % Final     Neutrophils, Absolute   Date Value Ref Range Status   10/06/2020 9.14 (H) 1.70 - 7.00 10*3/mm3 Final     Lymphocytes, Absolute   Date Value Ref Range Status   10/06/2020 1.77 0.70 - 3.10 10*3/mm3 Final     Monocytes, Absolute   Date Value Ref Range Status   10/06/2020 0.77 0.10 - 0.90 10*3/mm3 Final      Eosinophils, Absolute   Date Value Ref Range Status   10/06/2020 0.01 0.00 - 0.40 10*3/mm3 Final     Basophils, Absolute   Date Value Ref Range Status   10/06/2020 0.02 0.00 - 0.20 10*3/mm3 Final     Immature Grans, Absolute   Date Value Ref Range Status   10/06/2020 0.12 (H) 0.00 - 0.05 10*3/mm3 Final     nRBC   Date Value Ref Range Status   10/06/2020 0.0 0.0 - 0.2 /100 WBC Final       Glucose   Date Value Ref Range Status   10/07/2020 99 65 - 99 mg/dL Final     Sodium   Date Value Ref Range Status   10/07/2020 138 136 - 145 mmol/L Final     Potassium   Date Value Ref Range Status   10/07/2020 3.8 3.5 - 5.2 mmol/L Final     CO2   Date Value Ref Range Status   10/07/2020 24.2 22.0 - 29.0 mmol/L Final     Chloride   Date Value Ref Range Status   10/07/2020 103 98 - 107 mmol/L Final     Anion Gap   Date Value Ref Range Status   10/07/2020 10.8 5.0 - 15.0 mmol/L Final     Creatinine   Date Value Ref Range Status   10/07/2020 1.25 (H) 0.57 - 1.00 mg/dL Final     BUN   Date Value Ref Range Status   10/07/2020 15 8 - 23 mg/dL Final     BUN/Creatinine Ratio   Date Value Ref Range Status   10/07/2020 12.0 7.0 - 25.0 Final     Calcium   Date Value Ref Range Status   10/07/2020 8.4 (L) 8.6 - 10.5 mg/dL Final     eGFR Non  Amer   Date Value Ref Range Status   10/07/2020 43 (L) >60 mL/min/1.73 Final     Alkaline Phosphatase   Date Value Ref Range Status   10/06/2020 122 (H) 39 - 117 U/L Final     Total Protein   Date Value Ref Range Status   10/06/2020 7.3 6.0 - 8.5 g/dL Final     ALT (SGPT)   Date Value Ref Range Status   10/06/2020 14 1 - 33 U/L Final     AST (SGOT)   Date Value Ref Range Status   10/06/2020 25 1 - 32 U/L Final     Total Bilirubin   Date Value Ref Range Status   10/06/2020 0.2 0.0 - 1.2 mg/dL Final     Albumin   Date Value Ref Range Status   10/06/2020 3.68 3.50 - 5.20 g/dL Final     Globulin   Date Value Ref Range Status   10/06/2020 3.6 gm/dL Final       No results found for: MG, PHOS  No  results found for: LDH, URICACID     IMAGING  Imaging Results (Last 72 Hours)     Procedure Component Value Units Date/Time    CT Abdomen Pelvis Without Contrast [[id number]] Collected: 10/06/20 1125     Updated: 10/06/20 1133    Narrative:      EXAM: CT ABDOMEN PELVIS WO CONTRAST-            TECHNIQUE: Multiple axial CT images were obtained from lung bases  through pubic symphysis WITHOUT administration of IV contrast.  Reformatted images in the coronal and/or sagittal plane(s) were  generated from the axial data set to facilitate diagnostic accuracy  and/or surgical planning.  Oral Contrast:NONE.     Radiation dose reduction techniques were utilized per ALARA protocol.  Automated exposure control was initiated through either or [employer] or  [employer] software packages by  protocol.    DOSE:     Clinical information abd pain, hematuria      Comparison NONE.     FINDINGS:     Lower thorax: Parenchymal nodule in the left lung base on image 4  measuring 9 mm  6 mm parenchymal nodule in the right lung base  Large hiatal hernia     Abdomen:     Liver: Low-attenuation lesion in the caudate lobe of the liver     Gallbladder: Surgically absent     Pancreas: Unremarkable. No mass or ductal dilatation.     Spleen: Homogeneous. No splenomegaly.     Adrenals: No mass.     Kidneys/ureters: Left-sided hydronephrosis and hydroureter to the level  of the urinary bladder where there appears to be a urinary bladder mass  extending into the distal left ureter.     GI tract: Moderate to large volume stool in the colon     Peritoneum: No free air. No free fluid or loculated fluid collections.     Mesentery: Unremarkable.     Lymph nodes: No lymphadenopathy.     Vasculature: No evidence of aneurysm.     Abdominal wall: No focal hernia or mass.        Pelvis:     Bladder: Abnormal urinary bladder mass which appears to also involve the  distal left ureter.     Reproductive: Unremarkable as visualized.     Appendix: No evidence  of appendicitis     Bones: Scoliosis and arthritic change       Impression:         1. Urinary bladder mass and it appears to extend into the distal left  ureter. Recommend urologic follow-up  2. Low-attenuation lesion in the liver and nodules in the lungs. These  are concerning for metastatic disease  3. Other findings as above                 This report was finalized on 10/6/2020 11:29 AM by Dr. Barry Batista MD.       CT Chest Without Contrast [[id number]] Collected: 10/06/20 1129     Updated: 10/06/20 1133    Narrative:      EXAM: CT CHEST WO CONTRAST-            CLINICAL INDICATION:sob      COMPARISON: Chest x-ray 10/06/2020     TECHNIQUE: Multiple axial CT images were obtained from lung apex through  upper abdomen without administration of IV contrast. Reformatted images  in the coronal and/or sagittal plane(s) were generated from the axial  data set to facilitate diagnostic accuracy and/or surgical planning.     Radiation dose reduction techniques were utilized per ALARA protocol.  Automated exposure control was initiated through either or [employer] or  [employer] software packages by  protocol.    DOSE (DLP mGy-cm):        FINDINGS:     Lungs: There are parenchymal nodules bilaterally concerning for  metastatic disease.  In addition, there are few groundglass opacities, predominantly in the  left lung, most likely concomitant pneumonia  Heart: Unremarkable.  Pericardium: No effusion.  Mediastinum: No masses. No enlarged lymph nodes.  No fluid collections.  Pleura: No pleural effusion. No pleural mass or abnormal calcification.  No pneumothorax.  Major airways: Clear. No intrinsic mass.  Vasculature: No evidence of aneurysm.   Visualized upper abdomen:Large hiatal hernia  Other: None.  Bones: No acute bony abnormality.       Impression:         1. Parenchymal nodules bilaterally concerning for metastatic disease.  Largest nodule is in the left upper lobe and measures 1.59 cm.  2. Few groundglass  opacities primarily in the left lung but concerning  for concomitant pneumonia  3. Other findings as above        This report was finalized on 10/6/2020 11:31 AM by Dr. Barry Batista MD.       XR Chest 1 View [[id number]] Collected: 10/06/20 1043     Updated: 10/06/20 1046    Narrative:      XR CHEST 1 VW-     CLINICAL INDICATION: fatigue        COMPARISON: None available      TECHNIQUE: Single frontal view of the chest.     FINDINGS:     Left-sided consolidation concerning for pneumonia  The cardiac silhouette is normal. The pulmonary vasculature is  unremarkable.  There is no evidence of an acute osseous abnormality.   There are no suspicious-appearing parenchymal soft tissue nodules.          Impression:      Left-sided consolidation concerning for pneumonia     This report was finalized on 10/6/2020 10:44 AM by Dr. Barry Batista MD.       US Pelvis Complete [[id number]] Collected: 10/06/20 0940     Updated: 10/06/20 0943    Narrative:      EXAMINATION: US PELVIS COMPLETE-      CLINICAL INDICATION: pain        COMPARISON: None available     FINDINGS: Transabdominal sonographic imaging of the pelvis shows uterus  measuring 9.30 x 3.21 cm.  Endometrium is 5.8 mm.  No complex uterine mass     There is what appears to be a mass within the bladder measuring 4.3 cm.  I would suggest cystoscopy.       Impression:      1. No complex uterine mass. Right ovary unable to be visualized. Left  ovary unremarkable.  2. Echogenic material within the urinary bladder. Recommend urologic  follow-up      This report was finalized on 10/6/2020 9:41 AM by Dr. Barry Batista MD.             Impression #1.-Bladder lesion likely metastatic bladder cancer for TURBT she has serious anemia

## 2020-10-07 NOTE — PROGRESS NOTES
[facility] HOSPITALIST PROGRESS NOTE     Patient Identification:  Name:  Rosaura Andujar  Age:  70 y.o.  Sex:  female  :  1950  MRN:  [id number]  Visit Number:  [id number]  ROOM: [facility] OR/NONE     Primary Care Provider:  Leeann Gaytan APRN    Length of stay:  1    Subjective     Chief Complaint   Patient presents with   • Blood in Urine     10/7 -patient seen and examined reviewed her condition earlier today, she denied having any active chest pain she has been having intermittent hematuria still denied having any further changes, is been coughing with less sputum production so far.      Objective     Current Hospital Meds:[MAR Hold] amphetamine-dextroamphetamine, 30 mg, Oral, QAM  [MAR Hold] azithromycin, 500 mg, Intravenous, Q24H  [MAR Hold] cefTRIAXone, 1 g, Intravenous, Q24H  gabapentin, 100 mg, Oral, Q12H  gentamicin, 80 mg, Intravenous, Once  [MAR Hold] lactobacillus acidophilus, 1 capsule, Oral, Daily  [MAR Hold] sodium chloride, 10 mL, Intravenous, Q12H  sodium chloride, 10 mL, Intravenous, Q12H  [MAR Hold] zolpidem, 5 mg, Oral, Nightly    lactated ringers, 125 mL/hr      ----------------------------------------------------------------------------------------------------------------------  Vital Signs:  Temp:  [97.9 °F (36.6 °C)-98.7 °F (37.1 °C)] 98.7 °F (37.1 °C)  Heart Rate:  [] 82  Resp:  [16-20] 20  BP: (106-135)/(55-96) 119/80  SpO2:  [93 %-99 %] 99 %  on  Flow (L/min):  [2] 2;   Device (Oxygen Therapy): nasal cannula  Body mass index is 46.64 kg/m².    Wt Readings from Last 3 Encounters:   10/07/20 131 kg (288 lb 12.8 oz)       Intake/Output Summary (Last 24 hours) at 10/7/2020 1135  Last data filed at 10/7/2020 0200  Gross per 24 hour   Intake 580 ml   Output 1100 ml   Net -520 ml     NPO Diet  ----------------------------------------------------------------------------------------------------------------------  Physical exam:  Constitutional:  Well-developed and  well-nourished.  Moderate respiratory distress especially when speaking full sentences   HENT:  Head: Normocephalic and atraumatic.  Mouth:  Moist mucous membranes.    Eyes:  Conjunctivae and EOM are normal.  Pupils are equal, round, and reactive to light.  No scleral icterus.  Neck:  Neck supple.  No JVD present.    Cardiovascular:  Normal rate, regular rhythm and normal heart sounds with no murmur.  Pulmonary/Chest:  N reduced air entry at the bases without any other added sounds, normal chest expansion.  Abdominal:  Soft.  Bowel sounds are normal.  No distension and no tenderness.   Musculoskeletal:  No edema, no tenderness, and no deformity.  No red or swollen joints anywhere.    Neurological:  Alert and oriented to person, place, and time.  No cranial nerve deficit.  No tongue deviation.  No facial droop.  No slurred speech.   Skin:  Skin is warm and dry.  No rash noted.  No pallor.   Peripheral vascular:  No edema and strong pulses on all 4 extremities.     ----------------------------------------------------------------------------------------------------------------------  Results from last 7 days   Lab Units 10/07/20  0051 10/06/20  1159 10/06/20  0852   LACTATE mmol/L  --  0.7  --    WBC 10*3/mm3 7.69  --  11.83*   HEMOGLOBIN g/dL 8.1*  --  9.4*   HEMATOCRIT % 27.0*  --  32.4*   MCV fL 83.6  --  84.2   MCHC g/dL 30.0*  --  29.0*   PLATELETS 10*3/mm3 278  --  459*   INR   --   --  1.05     Results from last 7 days   Lab Units 10/07/20  0051 10/06/20  0852   SODIUM mmol/L 138 141   POTASSIUM mmol/L 3.8 3.9   CHLORIDE mmol/L 103 101   CO2 mmol/L 24.2 29.1*   BUN mg/dL 15 17   CREATININE mg/dL 1.25* 1.45*   EGFR IF NONAFRICN AM mL/min/1.73 43* 36*   CALCIUM mg/dL 8.4* 8.9   GLUCOSE mg/dL 99 170*   ALBUMIN g/dL  --  3.68   BILIRUBIN mg/dL  --  0.2   ALK PHOS U/L  --  122*   AST (SGOT) U/L  --  25   ALT (SGPT) U/L  --  14   Estimated Creatinine Clearance: 59.8 mL/min (A) (by C-G formula based on SCr of 1.25  mg/dL (H)).  Results from last 7 days   Lab Units 10/06/20  0852   TROPONIN T ng/mL <0.010     No results found for: HGBA1C, POCGLU  No results found for: AMMONIA    Results from last 7 days   Lab Units 10/06/20  0940   NITRITE UA  Negative   WBC UA /HPF 3-5*   BACTERIA UA /HPF 1+*   SQUAM EPITHEL UA /HPF 0-2     No results found for: BLOODCXNo results found for: RESPCXNo results found for: URINECXNo results found for: WOUNDCXNo results found for: BODYFLDCXNo results found for: STOOLCX  I have personally looked at the labs and they are summarized above.  ----------------------------------------------------------------------------------------------------------------------  Imaging Results (Last 24 Hours)     ** No results found for the last 24 hours. **        I have personally reviewed the radiology images and read the final radiology report.        Assessment & Plan      *Mild hypoxia at initial presentation, down to 88% on room air, suspected due to CAP, improved  *Anemia of chronic disease plus anemia of chronic blood loss due to persistent hematuria for 4 weeks  *CAP in the left upper lobe, negative COVID-19  *Painless hematuria with bladder tumor suspicious of bladder malignancy with possible metastasis to the liver and lungs  *Hydronephrosis and hydroureter due to above  *Mild leukocytosis due to above  *History of neuropathy, patient takes Lyrica 400 mg a day  *Renal insufficiency, possibly obstructive uropathy due to above, mild improved post hydration in the ED     --Continue current antibiotics, follow-up on the final cultures  --Follow-up on the patient post cystoscopy going for biopsy, possible intervention if an active bleed is observed  --Continue to monitor CBC and BMP, will transfuse below 7, will add iron studies   --SCDs for DVT prophylaxis  --After cystoscopy may start the patient on IV fluids based on the intervention.    Gayatri Romero MD  10/07/20  11:35 EDT

## 2020-10-07 NOTE — ANESTHESIA POSTPROCEDURE EVALUATION
Patient: Rosaura Andujar    Procedure Summary     Date: 10/07/20 Room / Location:  [facility] OR 06 /  [facility]    Anesthesia Start: 1237 Anesthesia Stop: 1325    Procedure: CYSTOSCOPY TRANSURETHRAL RESECTION OF BLADDER TUMOR (N/A ) Diagnosis:       Bladder tumor      (Bladder tumor [D49.4])    Surgeon: Josiah Sabillon MD Provider: Hernesto Archuleta DO    Anesthesia Type: general ASA Status: 3          Anesthesia Type: general    Vitals  Vitals Value Taken Time   /66 10/07/20 1426   Temp 97.4 °F (36.3 °C) 10/07/20 1357   Pulse 76 10/07/20 1426   Resp 15 10/07/20 1426   SpO2 100 % 10/07/20 1426           Post Anesthesia Care and Evaluation    Patient location during evaluation: PHASE II  Patient participation: complete - patient participated  Level of consciousness: awake and alert  Pain score: 1  Pain management: adequate  Airway patency: patent  Anesthetic complications: No anesthetic complications  PONV Status: controlled  Cardiovascular status: acceptable  Respiratory status: acceptable  Hydration status: acceptable

## 2020-10-07 NOTE — PLAN OF CARE
Problem: Adult Inpatient Plan of Care  Goal: Plan of Care Review  Outcome: Ongoing, Progressing  Flowsheets (Taken 10/7/2020 0440)  Progress: no change  Plan of Care Reviewed With: spouse  Outcome Summary: Patient appears to be resting comfortably at this time. Will continue to monitor. NPO since midnight.  Goal: Patient-Specific Goal (Individualized)  Outcome: Ongoing, Progressing  Goal: Absence of Hospital-Acquired Illness or Injury  Outcome: Ongoing, Progressing  Intervention: Identify and Manage Fall Risk  Recent Flowsheet Documentation  Taken 10/7/2020 0300 by Marcella Wasserman RN  Safety Promotion/Fall Prevention: safety round/check completed  Taken 10/7/2020 0100 by Marcella Wasserman RN  Safety Promotion/Fall Prevention: safety round/check completed  Taken 10/6/2020 2300 by Marcella Wasserman RN  Safety Promotion/Fall Prevention: safety round/check completed  Taken 10/6/2020 2100 by Marcella Wasserman RN  Safety Promotion/Fall Prevention: safety round/check completed  Taken 10/6/2020 1900 by Marcella Wasserman RN  Safety Promotion/Fall Prevention: safety round/check completed  Intervention: Prevent and Manage VTE (venous thromboembolism) Risk  Recent Flowsheet Documentation  Taken 10/6/2020 2100 by Marcella Wasserman RN  VTE Prevention/Management: sequential compression devices on  Goal: Optimal Comfort and Wellbeing  Outcome: Ongoing, Progressing  Intervention: Provide Person-Centered Care  Recent Flowsheet Documentation  Taken 10/6/2020 2100 by Marcella Wasserman RN  Trust Relationship/Rapport:   choices provided   care explained   emotional support provided   empathic listening provided   questions answered   questions encouraged   reassurance provided   thoughts/feelings acknowledged  Goal: Readiness for Transition of Care  Outcome: Ongoing, Progressing     Problem: Adult Inpatient Plan of Care  Goal: Optimal Comfort and Wellbeing  Outcome: Ongoing,  Progressing  Intervention: Provide Person-Centered Care  Recent Flowsheet Documentation  Taken 10/6/2020 2100 by Marcella Wasserman RN  Trust Relationship/Rapport:   choices provided   care explained   emotional support provided   empathic listening provided   questions answered   questions encouraged   reassurance provided   thoughts/feelings acknowledged     Problem: Infection  Goal: Infection Symptom Resolution  Outcome: Ongoing, Progressing     Problem: Fluid Imbalance (Pneumonia)  Goal: Fluid Balance  Outcome: Ongoing, Progressing     Problem: Infection (Pneumonia)  Goal: Resolution of Infection Signs and Symptoms  Outcome: Ongoing, Progressing     Problem: Respiratory Compromise (Pneumonia)  Goal: Effective Oxygenation and Ventilation  Outcome: Ongoing, Progressing     Problem: Fall Injury Risk  Goal: Absence of Fall and Fall-Related Injury  Outcome: Ongoing, Progressing  Intervention: Promote Injury-Free Environment  Recent Flowsheet Documentation  Taken 10/7/2020 0300 by Marcella Wasserman RN  Safety Promotion/Fall Prevention: safety round/check completed  Taken 10/7/2020 0100 by Marcella Wasserman RN  Safety Promotion/Fall Prevention: safety round/check completed  Taken 10/6/2020 2300 by Marcella Wasserman RN  Safety Promotion/Fall Prevention: safety round/check completed  Taken 10/6/2020 2100 by Marcella Wasserman RN  Safety Promotion/Fall Prevention: safety round/check completed  Taken 10/6/2020 1900 by Marcella Wasserman RN  Safety Promotion/Fall Prevention: safety round/check completed   Goal Outcome Evaluation:  Plan of Care Reviewed With: spouse  Progress: no change  Outcome Summary: Patient appears to be resting comfortably at this time. Will continue to monitor. NPO since midnight.

## 2020-10-07 NOTE — ANESTHESIA PROCEDURE NOTES
Airway  Urgency: elective    Date/Time: 10/7/2020 12:42 PM  Airway not difficult    General Information and Staff    Patient location during procedure: OR  Anesthesiologist: Hernesto Archuleta DO  CRNA: Suzette Mark CRNA    Indications and Patient Condition  Indications for airway management: airway protection    Preoxygenated: yes  Mask difficulty assessment: 0 - not attempted    Final Airway Details  Final airway type: supraglottic airway      Successful airway: unique  Size 4    Number of attempts at approach: 1  Assessment: lips, teeth, and gum same as pre-op

## 2020-10-07 NOTE — OP NOTE
CYSTOSCOPY TRANSURETHRAL RESECTION OF BLADDER TUMOR  Procedure Note    Rosaura Andujar  10/7/2020    Pre-op Diagnosis:   Bladder tumor [D49.4]    Post-op Diagnosis:     Post-Op Diagnosis Codes:     * Bladder tumor [D49.4]    Procedure/CPT® Codes:  70-year-old white female presented the emergency room with severe anemia and gross hematuria for 4 months.  Her brother is currently undergoing chemotherapy for bladder cancer.  She was found to have liver mets lung mass and a large bladder mass.  She now presents for TURBT for diagnosis.  Following an informed consent brought the operative suite prepped and draped in sterile fashion she had an obvious left very aggressive appearing tumor resected down to the level capsular fibers got excellent hemostasis.  Clearly debulked that there was a large amount of neoplasm and was clearly metastatic hemostasis was excellent bimanual examination was negative at the end of the procedure.  She tolerated it well this tumor was greater than 5 cm in diameter    Procedure(s):  CYSTOSCOPY TRANSURETHRAL RESECTION OF BLADDER TUMOR-large    Surgeon(s):  Josiah Sabillon MD    Anesthesia: see anesthesia record    Staff:   Circulator: Lambert Borrego RN  Scrub Person: Pat Singletary Tina    Estimated Blood Loss: none  Urine Voided: * No values recorded between 10/7/2020 12:37 PM and 10/7/2020  1:06 PM *    Specimens:                ID Type Source Tests Collected by Time   A : bladder tumor  Tissue Urinary Bladder TISSUE PATHOLOGY EXAM Josiah Sabillon MD 10/7/2020 1304         Drains: Chung catheter    Findings: Bladder cancer    Blood: N/A    Complications: None    Grafts and Implants: None    Josiah Sabillon MD     Date: 10/7/2020  Time: 13:06 EDT

## 2020-10-07 NOTE — PLAN OF CARE
Goal Outcome Evaluation:  Plan of Care Reviewed With: patient  Progress: no change  Outcome Summary: Patient had surgery today, CBI currently going urine is clear at this time, no pain at this time, will continue to monitor.

## 2020-10-07 NOTE — ANESTHESIA PREPROCEDURE EVALUATION
Anesthesia Evaluation     Patient summary reviewed and Nursing notes reviewed   no history of anesthetic complications:               Airway   Mallampati: I  TM distance: >3 FB  Neck ROM: full  No difficulty expected  Dental - normal exam   (+) poor dentition    Pulmonary - negative pulmonary ROS and normal exam   Cardiovascular - normal exam  Exercise tolerance: good (4-7 METS)    NYHA Classification: II    (+) hypertension, hyperlipidemia,       Neuro/Psych- negative ROS  GI/Hepatic/Renal/Endo - negative ROS     Musculoskeletal (-) negative ROS    Abdominal  - normal exam    Bowel sounds: normal.   Substance History - negative use     OB/GYN negative ob/gyn ROS         Other      history of cancer                    Anesthesia Plan    ASA 3     general     intravenous induction     Anesthetic plan, all risks, benefits, and alternatives have been provided, discussed and informed consent has been obtained with: patient.    Plan discussed with CRNA.

## 2020-10-07 NOTE — PROGRESS NOTES
Continued Stay Note  JAYME Luong     Patient Name: Rosaura Andujar  MRN: [id number]  Today's Date: 10/7/2020    Admit Date: 10/6/2020    Discharge Plan     Row Name 10/07/20 1240       Plan    Plan CM has attempted to speak with pt via phone on several occasions earlier today without success.  Pt is now off floor in surgery.  CM will f/u with patient on 10/8/2020.    Plan Comments 10/7:  admitted with PNA & hematuria; suspect bladder tumor with poss mets to liver and lung; urology cslted & plan TURBT; Hgb 9.4 => 8.1; Rocephin, Zmax IV; 2 lpm 93%         Kori Hernandez RN

## 2020-10-07 NOTE — PAYOR COMM NOTE
"    Karen Sweeney  [facility]  Phone:[phone]  Fax: [phone]    Ref#:[id number]  Please see attached for: authorization approval for inpatient stay    I set up this case in Availity but did not add clinical, thanks         Roberto Andujar (70 y.o. Female)     Date of Birth Social Security Number Address Home Phone MRN    1950  [address]  [place] [address]  [id number]    Congregation Marital Status          None        Admission Date Admission Type Admitting Provider Attending Provider Department, Room/Bed    10/6/20 Emergency Gayatri Romero MD Baibars, Mhd Motaz, MD [facility] OPERATING ROOM DEPARTMENT, COR OR/NONE    Discharge Date Discharge Disposition Discharge Destination                       Attending Provider: Gayatri Romero MD    Allergies: Sulfa Antibiotics, Tetracyclines & Related, Erythromycin    Isolation: None   Infection: None   Code Status: CPR    Ht: 167.6 cm (65.98\")   Wt: 131 kg (288 lb 12.8 oz)    Admission Cmt: None   Principal Problem: None                Active Insurance as of 10/6/2020     Primary Coverage     Payor Plan Insurance Group Employer/Plan Group    ANTHEM MEDICARE REPLACEMENT ANTHEM MEDICARE ADVANTAGE [id number]     Payor Plan Address Payor Plan Phone Number Payor Plan Fax Number Effective Dates    [address] [phone]  1/1/2020 - None Entered    [facility] [id number]       Subscriber Name Subscriber Birth Date Member ID       ROBERTO ANDUJAR 1950 [id number]           Secondary Coverage     Payor Plan Insurance Group Employer/Plan Group    [place]      Payor Plan Address Payor Plan Phone Number Payor Plan Fax Number Effective Dates    [address] [phone]  10/6/2020 - None Entered    [place] [address]       Subscriber Name Subscriber Birth Date Member ID       ROBERTO ANDUJAR 1950 [id number]                 Emergency Contacts      (Rel.) Home Phone Work Phone " Mobile Phone    mr minda (Spouse) -- -- [phone]               History & Physical      Gayatri Romero MD at 10/06/20 1211              [facility]    Patient Identification:  Name:  Rosaura Andujar  Age:  70 y.o.  Sex:  female  :  1950  MRN:  [id number]   Visit Number:  [id number]  Room number:  110/10  Admit Date: 10/6/2020   Primary Care Physician:  Leeann Gaytan APRN     Chief complaint:    Chief Complaint   Patient presents with   • Blood in Urine       History of presenting illness:  70 y.o. female with no significant past medical history except of neuropathy, presented to emergency department after being referred from her gynecologist when she was evaluated for vaginal bleed.  The patient apparently has been complaining of hematuria and not a true vaginal bleed discharge and the patient has been having the symptoms for the past 4 months however over the past week it is becoming more persistent, it is painless, sometimes she feels right flank pain.  Apart from that the patient was having some dyspnea on exertion.  At her arrival to emergency department her pulse ox was in the 88% on room air.  The patient has been evaluated by her gynecologist with pelvic ultrasound that was unrevealing, she had a CT scan of the chest and pelvis and abdomen in the ER where she was found to have a bladder tumor causing obstructing in the left ureter causing hydroureter/hydronephrosis as well as infiltrate in the left lung.  She was started on IV antibiotics, consult with urology has been placed by the ER provider.  Medical admission was requested.    ---------------------------------------------------------------------------------------------------------------------   Review of Systems unremarkable except was mentioned above  ---------------------------------------------------------------------------------------------------------------------   History reviewed. No  pertinent past medical history.  History reviewed. No pertinent surgical history.  History reviewed. No pertinent family history.  Social History     Socioeconomic History   • Marital status:      Spouse name: Not on file   • Number of children: Not on file   • Years of education: Not on file   • Highest education level: Not on file   Tobacco Use   • Smoking status: Never Smoker   Substance and Sexual Activity   • Alcohol use: Not Currently   • Drug use: Never     ---------------------------------------------------------------------------------------------------------------------   Allergies:  Sulfa antibiotics and Tetracyclines & related  ---------------------------------------------------------------------------------------------------------------------   Prior to Admission Medications     None        ---------------------------------------------------------------------------------------------------------------------   Vital Signs:  Temp:  [98.9 °F (37.2 °C)] 98.9 °F (37.2 °C)  Heart Rate:  [83-98] 96  Resp:  [20] 20  BP: (106-136)/(55-96) 120/68    Mean Arterial Pressure (Non-Invasive) for the past 24 hrs (Last 3 readings):   Noninvasive MAP (mmHg)   10/06/20 1404 90   10/06/20 1353 97   10/06/20 1233 110     SpO2:  [91 %-98 %] 98 %  on  Flow (L/min):  [2] 2;   Device (Oxygen Therapy): nasal cannula  Body mass index is 29.86 kg/m².    Wt Readings from Last 3 Encounters:   10/06/20 83.9 kg (185 lb)               ---------------------------------------------------------------------------------------------------------------------   Physical Exam:  Constitutional:  Well-developed and well-nourished.  Moderate respiratory distress especially when speaking full sentences   HENT:  Head: Normocephalic and atraumatic.  Mouth:  Moist mucous membranes.    Eyes:  Conjunctivae and EOM are normal.  Pupils are equal, round, and reactive to light.  No scleral icterus.  Neck:  Neck supple.  No JVD present.       Cardiovascular:  Normal rate, regular rhythm and normal heart sounds with no murmur.  Pulmonary/Chest:  N reduced air entry at the bases without any other added sounds, normal chest expansion.  Abdominal:  Soft.  Bowel sounds are normal.  No distension and no tenderness.   Musculoskeletal:  No edema, no tenderness, and no deformity.  No red or swollen joints anywhere.    Neurological:  Alert and oriented to person, place, and time.  No cranial nerve deficit.  No tongue deviation.  No facial droop.  No slurred speech.   Skin:  Skin is warm and dry.  No rash noted.  No pallor.   Peripheral vascular:  No edema and strong pulses on all 4 extremities.      I have personally looked at both the EKG and the telemetry strips.  --------------------------------------------------------------------------------------------------------------------  Results from last 7 days   Lab Units 10/06/20  0852   TROPONIN T ng/mL <0.010     Results from last 7 days   Lab Units 10/06/20  1159 10/06/20  0852   LACTATE mmol/L 0.7  --    WBC 10*3/mm3  --  11.83*   HEMOGLOBIN g/dL  --  9.4*   HEMATOCRIT %  --  32.4*   MCV fL  --  84.2   MCHC g/dL  --  29.0*   PLATELETS 10*3/mm3  --  459*   INR   --  1.05     Results from last 7 days   Lab Units 10/06/20  0852   SODIUM mmol/L 141   POTASSIUM mmol/L 3.9   CHLORIDE mmol/L 101   CO2 mmol/L 29.1*   BUN mg/dL 17   CREATININE mg/dL 1.45*   EGFR IF NONAFRICN AM mL/min/1.73 36*   CALCIUM mg/dL 8.9   GLUCOSE mg/dL 170*   ALBUMIN g/dL 3.68   BILIRUBIN mg/dL 0.2   ALK PHOS U/L 122*   AST (SGOT) U/L 25   ALT (SGPT) U/L 14   Estimated Creatinine Clearance: 40.5 mL/min (A) (by C-G formula based on SCr of 1.45 mg/dL (H)).    No results found for: HGBA1C, POCGLU  No results found for: AMMONIA    Results from last 7 days   Lab Units 10/06/20  0940   NITRITE UA  Negative   WBC UA /HPF 3-5*   BACTERIA UA /HPF 1+*   SQUAM EPITHEL UA /HPF 0-2     No results found for: BLOODCXNo results found for: RESPCXNo results  found for: URINECXNo results found for: WOUNDCXNo results found for: BODYFLDCXNo results found for: STOOLCX  pH pH, Arterial   Date Value Ref Range Status   10/06/2020 7.412 7.350 - 7.450 pH units Final      pO2 pO2, Arterial   Date Value Ref Range Status   10/06/2020 60.0 (L) 83.0 - 108.0 mm Hg Final     Comment:     84 Value below reference range      pCO2 pCO2, Arterial   Date Value Ref Range Status   10/06/2020 48.8 (H) 35.0 - 45.0 mm Hg Final      HCO3 HCO3, Arterial   Date Value Ref Range Status   10/06/2020 31.0 (H) 20.0 - 26.0 mmol/L Final     Comment:     83 Value above reference range        I have personally looked at the labs and they are summarized above.  ----------------------------------------------------------------------------------------------------------------------  Imaging Results (Last 24 Hours)     Procedure Component Value Units Date/Time    CT Abdomen Pelvis Without Contrast [[id number]] Collected: 10/06/20 1125     Updated: 10/06/20 1133    Narrative:      EXAM: CT ABDOMEN PELVIS WO CONTRAST-            TECHNIQUE: Multiple axial CT images were obtained from lung bases  through pubic symphysis WITHOUT administration of IV contrast.  Reformatted images in the coronal and/or sagittal plane(s) were  generated from the axial data set to facilitate diagnostic accuracy  and/or surgical planning.  Oral Contrast:NONE.     Radiation dose reduction techniques were utilized per ALARA protocol.  Automated exposure control was initiated through either or [employer] or  [employer] software packages by  protocol.    DOSE:     Clinical information abd pain, hematuria      Comparison NONE.     FINDINGS:     Lower thorax: Parenchymal nodule in the left lung base on image 4  measuring 9 mm  6 mm parenchymal nodule in the right lung base  Large hiatal hernia     Abdomen:     Liver: Low-attenuation lesion in the caudate lobe of the liver     Gallbladder: Surgically absent     Pancreas: Unremarkable. No  mass or ductal dilatation.     Spleen: Homogeneous. No splenomegaly.     Adrenals: No mass.     Kidneys/ureters: Left-sided hydronephrosis and hydroureter to the level  of the urinary bladder where there appears to be a urinary bladder mass  extending into the distal left ureter.     GI tract: Moderate to large volume stool in the colon     Peritoneum: No free air. No free fluid or loculated fluid collections.     Mesentery: Unremarkable.     Lymph nodes: No lymphadenopathy.     Vasculature: No evidence of aneurysm.     Abdominal wall: No focal hernia or mass.        Pelvis:     Bladder: Abnormal urinary bladder mass which appears to also involve the  distal left ureter.     Reproductive: Unremarkable as visualized.     Appendix: No evidence of appendicitis     Bones: Scoliosis and arthritic change       Impression:         1. Urinary bladder mass and it appears to extend into the distal left  ureter. Recommend urologic follow-up  2. Low-attenuation lesion in the liver and nodules in the lungs. These  are concerning for metastatic disease  3. Other findings as above                 This report was finalized on 10/6/2020 11:29 AM by Dr. Barry Batista MD.       CT Chest Without Contrast [[id number]] Collected: 10/06/20 1129     Updated: 10/06/20 1133    Narrative:      EXAM: CT CHEST WO CONTRAST-            CLINICAL INDICATION:sob      COMPARISON: Chest x-ray 10/06/2020     TECHNIQUE: Multiple axial CT images were obtained from lung apex through  upper abdomen without administration of IV contrast. Reformatted images  in the coronal and/or sagittal plane(s) were generated from the axial  data set to facilitate diagnostic accuracy and/or surgical planning.     Radiation dose reduction techniques were utilized per ALARA protocol.  Automated exposure control was initiated through either or [employer] or  DoseRigEverPresent software packages by  protocol.    DOSE (DLP mGy-cm):        FINDINGS:     Lungs: There are  parenchymal nodules bilaterally concerning for  metastatic disease.  In addition, there are few groundglass opacities, predominantly in the  left lung, most likely concomitant pneumonia  Heart: Unremarkable.  Pericardium: No effusion.  Mediastinum: No masses. No enlarged lymph nodes.  No fluid collections.  Pleura: No pleural effusion. No pleural mass or abnormal calcification.  No pneumothorax.  Major airways: Clear. No intrinsic mass.  Vasculature: No evidence of aneurysm.   Visualized upper abdomen:Large hiatal hernia  Other: None.  Bones: No acute bony abnormality.       Impression:         1. Parenchymal nodules bilaterally concerning for metastatic disease.  Largest nodule is in the left upper lobe and measures 1.59 cm.  2. Few groundglass opacities primarily in the left lung but concerning  for concomitant pneumonia  3. Other findings as above        This report was finalized on 10/6/2020 11:31 AM by Dr. Barry Batista MD.       XR Chest 1 View [[id number]] Collected: 10/06/20 1043     Updated: 10/06/20 1046    Narrative:      XR CHEST 1 VW-     CLINICAL INDICATION: fatigue        COMPARISON: None available      TECHNIQUE: Single frontal view of the chest.     FINDINGS:     Left-sided consolidation concerning for pneumonia  The cardiac silhouette is normal. The pulmonary vasculature is  unremarkable.  There is no evidence of an acute osseous abnormality.   There are no suspicious-appearing parenchymal soft tissue nodules.          Impression:      Left-sided consolidation concerning for pneumonia     This report was finalized on 10/6/2020 10:44 AM by Dr. Brary Batista MD.       US Pelvis Complete [[id number]] Collected: 10/06/20 0940     Updated: 10/06/20 0943    Narrative:      EXAMINATION: US PELVIS COMPLETE-      CLINICAL INDICATION: pain        COMPARISON: None available     FINDINGS: Transabdominal sonographic imaging of the pelvis shows uterus  measuring 9.30 x 3.21 cm.  Endometrium is 5.8 mm.  No complex  uterine mass     There is what appears to be a mass within the bladder measuring 4.3 cm.  I would suggest cystoscopy.       Impression:      1. No complex uterine mass. Right ovary unable to be visualized. Left  ovary unremarkable.  2. Echogenic material within the urinary bladder. Recommend urologic  follow-up      This report was finalized on 10/6/2020 9:41 AM by Dr. Barry Batista MD.           I have personally reviewed the radiology images and read the final radiology report.  ----------------------------------------------------------------------------------------------------------------------        Assessment and Plan:    *Mild hypoxia at initial presentation, down to 88% on room air, suspected due to CAP  *CAP in the left upper lobe, negative COVID-19  *Painless hematuria with bladder tumor suspicious of bladder malignancy with possible metastasis to the liver and lungs  *Hydronephrosis and hydroureter due to above  *Mild leukocytosis due to above  *History of neuropathy, patient takes Lyrica 400 mg a day  *Renal insufficiency, possibly obstructive uropathy due to above    --MedSur admission  --For the pneumonia we will start the patient on ceftriaxone and azithromycin, will continue the current therapy and follow-up on the culture sent by ER, pneumococcal urine antigen will be checked  --SCDs for DVT prophylaxis  --Urology consult has been obtained in the ER, patient eventually will need cystoscopy and biopsy.   --With current examination and going to hold on IV fluids/diuretics although patient seems to have mild third spacing.  --CBC and BMP monitoring         Gayatri Romero MD  10/06/20  14:37 EDT    Electronically signed by Gayatri Romero MD at 10/06/20 3457         Lab Results (last 24 hours)     Procedure Component Value Units Date/Time    Blood Culture - Blood, Arm, Left [[id number]] Collected: 10/06/20 1152    Specimen: Blood from Arm, Left Updated: 10/07/20 1215     Blood Culture No growth  at 24 hours    Blood Culture - Blood, Arm, Right [[id number]] Collected: 10/06/20 1159    Specimen: Blood from Arm, Right Updated: 10/07/20 1215     Blood Culture No growth at 24 hours    S. Pneumo Ag Urine or CSF - Urine, Urine, Clean Catch [[id number]]  (Normal) Collected: 10/06/20 1655    Specimen: Urine, Clean Catch Updated: 10/07/20 0247     Strep Pneumo Ag Negative    Basic Metabolic Panel [[id number]]  (Abnormal) Collected: 10/07/20 0051    Specimen: Blood Updated: 10/07/20 0122     Glucose 99 mg/dL      BUN 15 mg/dL      Creatinine 1.25 mg/dL      Sodium 138 mmol/L      Potassium 3.8 mmol/L      Chloride 103 mmol/L      CO2 24.2 mmol/L      Calcium 8.4 mg/dL      eGFR Non African Amer 43 mL/min/1.73      BUN/Creatinine Ratio 12.0     Anion Gap 10.8 mmol/L     Narrative:      GFR Normal >60  Chronic Kidney Disease <60  Kidney Failure <15      CBC (No Diff) [[id number]]  (Abnormal) Collected: 10/07/20 0051    Specimen: Blood Updated: 10/07/20 0100     WBC 7.69 10*3/mm3      RBC 3.23 10*6/mm3      Hemoglobin 8.1 g/dL      Hematocrit 27.0 %      MCV 83.6 fL      MCH 25.1 pg      MCHC 30.0 g/dL      RDW 15.5 %      RDW-SD 47.2 fl      MPV 9.8 fL      Platelets 278 10*3/mm3     COVID-19, [facility]OTT IN-HOUSE,NP Swab (NO TRANSPORT MEDIA) 2 HR TAT - Swab, Nasopharynx [[id number]]  (Normal) Collected: 10/06/20 1211    Specimen: Swab from Nasopharynx Updated: 10/06/20 1303     COVID19 Not Detected    Narrative:      Fact sheet for providers: https://www.fda.gov/media/085386/download     Fact sheet for patients: https://www.fda.gov/media/923525/download        Imaging Results (Last 24 Hours)     Procedure Component Value Units Date/Time    FL loulou endo (surgery) [[id number]] Resulted: 10/07/20 1232     Updated: 10/07/20 1232    Narrative:      This procedure was auto-finalized with no dictation required.        ECG/EMG Results (last 24 hours)     Procedure Component Value Units Date/Time    ECG 12 Lead [[id number]] Collected:  10/06/20 0947     Updated: 10/06/20 1305    Narrative:      Test Reason : sob  Blood Pressure : **/** mmHG  Vent. Rate : 087 BPM     Atrial Rate : 087 BPM     P-R Int : 156 ms          QRS Dur : 068 ms      QT Int : 342 ms       P-R-T Axes : 078 017 024 degrees     QTc Int : 411 ms    Normal sinus rhythm  Cannot rule out Anterior infarct , age undetermined  Abnormal ECG  No previous ECGs available  Confirmed by Silas Manuel () on 10/6/2020 1:05:10 PM    Referred By:  CURD           Confirmed By:Silas Manuel             Physician Progress Notes (last 24 hours) (Notes from 10/06/20 1247 through 10/07/20 1247)      Gayatri Romero MD at 10/07/20 1135              [facility]     Patient Identification:  Name:  Rosaura Andujar  Age:  70 y.o.  Sex:  female  :  1950  MRN:  [id number]  Visit Number:  [id number]  ROOM: [facility] OR/NONE     Primary Care Provider:  Leeann Gaytan APRN    Length of stay:  1    Subjective     Chief Complaint   Patient presents with   • Blood in Urine     10/7 -patient seen and examined reviewed her condition earlier today, she denied having any active chest pain she has been having intermittent hematuria still denied having any further changes, is been coughing with less sputum production so far.      Objective     Current Hospital Meds:[MAR Hold] amphetamine-dextroamphetamine, 30 mg, Oral, QAM  [MAR Hold] azithromycin, 500 mg, Intravenous, Q24H  [MAR Hold] cefTRIAXone, 1 g, Intravenous, Q24H  gabapentin, 100 mg, Oral, Q12H  gentamicin, 80 mg, Intravenous, Once  [MAR Hold] lactobacillus acidophilus, 1 capsule, Oral, Daily  [MAR Hold] sodium chloride, 10 mL, Intravenous, Q12H  sodium chloride, 10 mL, Intravenous, Q12H  [MAR Hold] zolpidem, 5 mg, Oral, Nightly    lactated ringers, 125 mL/hr      ----------------------------------------------------------------------------------------------------------------------  Vital Signs:  Temp:   [97.9 °F (36.6 °C)-98.7 °F (37.1 °C)] 98.7 °F (37.1 °C)  Heart Rate:  [] 82  Resp:  [16-20] 20  BP: (106-135)/(55-96) 119/80  SpO2:  [93 %-99 %] 99 %  on  Flow (L/min):  [2] 2;   Device (Oxygen Therapy): nasal cannula  Body mass index is 46.64 kg/m².    Wt Readings from Last 3 Encounters:   10/07/20 131 kg (288 lb 12.8 oz)       Intake/Output Summary (Last 24 hours) at 10/7/2020 1135  Last data filed at 10/7/2020 0200  Gross per 24 hour   Intake 580 ml   Output 1100 ml   Net -520 ml     NPO Diet  ----------------------------------------------------------------------------------------------------------------------  Physical exam:  Constitutional:  Well-developed and well-nourished.  Moderate respiratory distress especially when speaking full sentences   HENT:  Head: Normocephalic and atraumatic.  Mouth:  Moist mucous membranes.    Eyes:  Conjunctivae and EOM are normal.  Pupils are equal, round, and reactive to light.  No scleral icterus.  Neck:  Neck supple.  No JVD present.    Cardiovascular:  Normal rate, regular rhythm and normal heart sounds with no murmur.  Pulmonary/Chest:  N reduced air entry at the bases without any other added sounds, normal chest expansion.  Abdominal:  Soft.  Bowel sounds are normal.  No distension and no tenderness.   Musculoskeletal:  No edema, no tenderness, and no deformity.  No red or swollen joints anywhere.    Neurological:  Alert and oriented to person, place, and time.  No cranial nerve deficit.  No tongue deviation.  No facial droop.  No slurred speech.   Skin:  Skin is warm and dry.  No rash noted.  No pallor.   Peripheral vascular:  No edema and strong pulses on all 4 extremities.     ----------------------------------------------------------------------------------------------------------------------  Results from last 7 days   Lab Units 10/07/20  0051 10/06/20  1159 10/06/20  0852   LACTATE mmol/L  --  0.7  --    WBC 10*3/mm3 7.69  --  11.83*   HEMOGLOBIN g/dL 8.1*  --   9.4*   HEMATOCRIT % 27.0*  --  32.4*   MCV fL 83.6  --  84.2   MCHC g/dL 30.0*  --  29.0*   PLATELETS 10*3/mm3 278  --  459*   INR   --   --  1.05     Results from last 7 days   Lab Units 10/07/20  0051 10/06/20  0852   SODIUM mmol/L 138 141   POTASSIUM mmol/L 3.8 3.9   CHLORIDE mmol/L 103 101   CO2 mmol/L 24.2 29.1*   BUN mg/dL 15 17   CREATININE mg/dL 1.25* 1.45*   EGFR IF NONAFRICN AM mL/min/1.73 43* 36*   CALCIUM mg/dL 8.4* 8.9   GLUCOSE mg/dL 99 170*   ALBUMIN g/dL  --  3.68   BILIRUBIN mg/dL  --  0.2   ALK PHOS U/L  --  122*   AST (SGOT) U/L  --  25   ALT (SGPT) U/L  --  14   Estimated Creatinine Clearance: 59.8 mL/min (A) (by C-G formula based on SCr of 1.25 mg/dL (H)).  Results from last 7 days   Lab Units 10/06/20  0852   TROPONIN T ng/mL <0.010     No results found for: HGBA1C, POCGLU  No results found for: AMMONIA    Results from last 7 days   Lab Units 10/06/20  0940   NITRITE UA  Negative   WBC UA /HPF 3-5*   BACTERIA UA /HPF 1+*   SQUAM EPITHEL UA /HPF 0-2     No results found for: BLOODCXNo results found for: RESPCXNo results found for: URINECXNo results found for: WOUNDCXNo results found for: BODYFLDCXNo results found for: STOOLCX  I have personally looked at the labs and they are summarized above.  ----------------------------------------------------------------------------------------------------------------------  Imaging Results (Last 24 Hours)     ** No results found for the last 24 hours. **        I have personally reviewed the radiology images and read the final radiology report.        Assessment & Plan      *Mild hypoxia at initial presentation, down to 88% on room air, suspected due to CAP, improved  *Anemia of chronic disease plus anemia of chronic blood loss due to persistent hematuria for 4 weeks  *CAP in the left upper lobe, negative COVID-19  *Painless hematuria with bladder tumor suspicious of bladder malignancy with possible metastasis to the liver and lungs  *Hydronephrosis and  hydroureter due to above  *Mild leukocytosis due to above  *History of neuropathy, patient takes Lyrica 400 mg a day  *Renal insufficiency, possibly obstructive uropathy due to above, mild improved post hydration in the ED     --Continue current antibiotics, follow-up on the final cultures  --Follow-up on the patient post cystoscopy going for biopsy, possible intervention if an active bleed is observed  --Continue to monitor CBC and BMP, will transfuse below 7, will add iron studies   --SCDs for DVT prophylaxis  --After cystoscopy may start the patient on IV fluids based on the intervention.    Gayatri Romero MD  10/07/20  11:35 EDT        Electronically signed by Gayatri Romero MD at 10/07/20 1140          Consult Notes (last 24 hours) (Notes from 10/06/20 1247 through 10/07/20 1247)      Josiah Sabillon MD at 10/07/20 0723            Name:  Rosaura Andujar  :  1950    DATE OF ADMISSION  10/6/2020    DATE OF CONSULT  10/7/2020           PRIMARY CARE PHYSICIAN  Leeann Gaytan, APRN    REASON FOR CONSULT  Gross hematuria for 4 months    CHIEF COMPLAINT  Chief Complaint   Patient presents with   • Blood in Urine       HISTORY OF PRESENT ILLNESS:   Rosaura Andujar is a 70 y.o. female who has gross painless hematuria for 4 months is seen to nurse practitioners a GYN thought to be vaginal bleeding eventually patient realized it was from her bladder.  Her brother had bladder cancer as well.  She is not smoked for many years she has no other significant risk factors.  She has significant burning she has significant anemia.  She denies other significant medical problems.    I saw Rosaura Andujar in their hospital room this morning.    PAST MEDICAL HISTORY  Past Medical History:   Diagnosis Date   • Cancer (CMS/HCC)    • Hyperlipidemia    • Hypertension        PAST SURGICAL HISTORY  Past Surgical History:   Procedure Laterality Date   • OOPHORECTOMY Right        SOCIAL HISTORY  Social History     Socioeconomic  History   • Marital status:      Spouse name: Not on file   • Number of children: Not on file   • Years of education: Not on file   • Highest education level: Not on file   Tobacco Use   • Smoking status: Never Smoker   Substance and Sexual Activity   • Alcohol use: Not Currently   • Drug use: Never       FAMILY HISTORY  History reviewed. No pertinent family history.    ALLERGIES  Allergies   Allergen Reactions   • Sulfa Antibiotics Anaphylaxis   • Tetracyclines & Related Anaphylaxis       INPATIENT MEDICATIONS  Current Facility-Administered Medications   Medication Dose Route Frequency Provider Last Rate Last Dose   • amphetamine-dextroamphetamine (ADDERALL) tablet 30 mg  30 mg Oral QAM Margret Dorsey DO       • azithromycin 500 MG/250 ML 0.9% NS IVPB (MBP)  500 mg Intravenous Q24H Gayatri Romero MD       • cefTRIAXone (ROCEPHIN) 1 g/100 mL 0.9% NS (MBP)  1 g Intravenous Q24H Gayatri Romero MD       • gabapentin (NEURONTIN) capsule 100 mg  100 mg Oral Q12H Margret Dorsey DO       • sodium chloride 0.9 % flush 10 mL  10 mL Intravenous PRN Luis Carlos Moreno PA       • sodium chloride 0.9 % flush 10 mL  10 mL Intravenous Q12H Gayatri Romero MD       • sodium chloride 0.9 % flush 10 mL  10 mL Intravenous PRN Gayatri Romero MD       • zolpidem (AMBIEN) tablet 5 mg  5 mg Oral Nightly Margret Dorsey DO   5 mg at 10/06/20 2147       REVIEW OF SYSTEMS  CONSTITUTIONAL:  No fever, chills, night sweats or fatigue.  EYES:  No blurry vision, diplopia or other vision changes.  ENT:  No hearing loss, nosebleeds or sore throat.  CARDIOVASCULAR:  No palpitations, arrhythmia, syncopal episodes or edema.  PULMONARY:  No hemoptysis, wheezing, chronic cough or shortness of breath.  GASTROINTESTINAL:  No nausea or vomiting.  No constipation or diarrhea.  No abdominal pain.  GENITOURINARY:  No hematuria, kidney stones or frequent urination.  MUSCULOSKELETAL:  No joint or back  "pains.  INTEGUMENTARY: No rashes or pruritus.  ENDOCRINE:  No excessive thirst or hot flashes.  HEMATOLOGIC:  No history of free bleeding, spontaneous bleeding or clotting.  IMMUNOLOGIC:  No allergies or frequent infections.  NEUROLOGIC: No numbness, tingling, seizures or weakness.  PSYCHIATRIC:  No anxiety or depression.    PHYSICAL EXAMINATION    /55 (BP Location: Right arm, Patient Position: Lying)   Pulse 87   Temp 97.9 °F (36.6 °C) (Oral)   Resp 20   Ht 167.6 cm (66\")   Wt 131 kg (289 lb)   SpO2 93%   BMI 46.65 kg/m²     GENERAL:  A well-developed, well-nourished, white female in no acute distress.  HEENT:  Pupils equally round and reactive to light.  Extraocular muscles intact.  CARDIOVASCULAR:  Regular rate and rhythm.  No murmurs, gallops or rubs.  LUNGS:  Clear to auscultation bilaterally.  ABDOMEN:  Soft, nontender, nondistended with positive bowel sounds.  EXTREMITIES:  No clubbing, cyanosis or edema bilaterally.  SKIN:  No rashes or petechiae.  NEURO:  Cranial nerves grossly intact.  No focal deficits.  PSYCH:  Alert and oriented x3.    LABORATORY     WBC   Date Value Ref Range Status   10/07/2020 7.69 3.40 - 10.80 10*3/mm3 Final     RBC   Date Value Ref Range Status   10/07/2020 3.23 (L) 3.77 - 5.28 10*6/mm3 Final     Hemoglobin   Date Value Ref Range Status   10/07/2020 8.1 (L) 12.0 - 15.9 g/dL Final     Hematocrit   Date Value Ref Range Status   10/07/2020 27.0 (L) 34.0 - 46.6 % Final     MCV   Date Value Ref Range Status   10/07/2020 83.6 79.0 - 97.0 fL Final     MCH   Date Value Ref Range Status   10/07/2020 25.1 (L) 26.6 - 33.0 pg Final     MCHC   Date Value Ref Range Status   10/07/2020 30.0 (L) 31.5 - 35.7 g/dL Final     RDW   Date Value Ref Range Status   10/07/2020 15.5 (H) 12.3 - 15.4 % Final     RDW-SD   Date Value Ref Range Status   10/07/2020 47.2 37.0 - 54.0 fl Final     MPV   Date Value Ref Range Status   10/07/2020 9.8 6.0 - 12.0 fL Final     Platelets   Date Value Ref Range " Status   10/07/2020 278 140 - 450 10*3/mm3 Final     Neutrophil %   Date Value Ref Range Status   10/06/2020 77.2 (H) 42.7 - 76.0 % Final     Lymphocyte %   Date Value Ref Range Status   10/06/2020 15.0 (L) 19.6 - 45.3 % Final     Monocyte %   Date Value Ref Range Status   10/06/2020 6.5 5.0 - 12.0 % Final     Eosinophil %   Date Value Ref Range Status   10/06/2020 0.1 (L) 0.3 - 6.2 % Final     Basophil %   Date Value Ref Range Status   10/06/2020 0.2 0.0 - 1.5 % Final     Immature Grans %   Date Value Ref Range Status   10/06/2020 1.0 (H) 0.0 - 0.5 % Final     Neutrophils, Absolute   Date Value Ref Range Status   10/06/2020 9.14 (H) 1.70 - 7.00 10*3/mm3 Final     Lymphocytes, Absolute   Date Value Ref Range Status   10/06/2020 1.77 0.70 - 3.10 10*3/mm3 Final     Monocytes, Absolute   Date Value Ref Range Status   10/06/2020 0.77 0.10 - 0.90 10*3/mm3 Final     Eosinophils, Absolute   Date Value Ref Range Status   10/06/2020 0.01 0.00 - 0.40 10*3/mm3 Final     Basophils, Absolute   Date Value Ref Range Status   10/06/2020 0.02 0.00 - 0.20 10*3/mm3 Final     Immature Grans, Absolute   Date Value Ref Range Status   10/06/2020 0.12 (H) 0.00 - 0.05 10*3/mm3 Final     nRBC   Date Value Ref Range Status   10/06/2020 0.0 0.0 - 0.2 /100 WBC Final       Glucose   Date Value Ref Range Status   10/07/2020 99 65 - 99 mg/dL Final     Sodium   Date Value Ref Range Status   10/07/2020 138 136 - 145 mmol/L Final     Potassium   Date Value Ref Range Status   10/07/2020 3.8 3.5 - 5.2 mmol/L Final     CO2   Date Value Ref Range Status   10/07/2020 24.2 22.0 - 29.0 mmol/L Final     Chloride   Date Value Ref Range Status   10/07/2020 103 98 - 107 mmol/L Final     Anion Gap   Date Value Ref Range Status   10/07/2020 10.8 5.0 - 15.0 mmol/L Final     Creatinine   Date Value Ref Range Status   10/07/2020 1.25 (H) 0.57 - 1.00 mg/dL Final     BUN   Date Value Ref Range Status   10/07/2020 15 8 - 23 mg/dL Final     BUN/Creatinine Ratio   Date  Value Ref Range Status   10/07/2020 12.0 7.0 - 25.0 Final     Calcium   Date Value Ref Range Status   10/07/2020 8.4 (L) 8.6 - 10.5 mg/dL Final     eGFR Non  Amer   Date Value Ref Range Status   10/07/2020 43 (L) >60 mL/min/1.73 Final     Alkaline Phosphatase   Date Value Ref Range Status   10/06/2020 122 (H) 39 - 117 U/L Final     Total Protein   Date Value Ref Range Status   10/06/2020 7.3 6.0 - 8.5 g/dL Final     ALT (SGPT)   Date Value Ref Range Status   10/06/2020 14 1 - 33 U/L Final     AST (SGOT)   Date Value Ref Range Status   10/06/2020 25 1 - 32 U/L Final     Total Bilirubin   Date Value Ref Range Status   10/06/2020 0.2 0.0 - 1.2 mg/dL Final     Albumin   Date Value Ref Range Status   10/06/2020 3.68 3.50 - 5.20 g/dL Final     Globulin   Date Value Ref Range Status   10/06/2020 3.6 gm/dL Final       No results found for: MG, PHOS  No results found for: LDH, URICACID     IMAGING  Imaging Results (Last 72 Hours)     Procedure Component Value Units Date/Time    CT Abdomen Pelvis Without Contrast [[id number]] Collected: 10/06/20 1125     Updated: 10/06/20 1133    Narrative:      EXAM: CT ABDOMEN PELVIS WO CONTRAST-            TECHNIQUE: Multiple axial CT images were obtained from lung bases  through pubic symphysis WITHOUT administration of IV contrast.  Reformatted images in the coronal and/or sagittal plane(s) were  generated from the axial data set to facilitate diagnostic accuracy  and/or surgical planning.  Oral Contrast:NONE.     Radiation dose reduction techniques were utilized per ALARA protocol.  Automated exposure control was initiated through either or [employer] or  [employer] software packages by  protocol.    DOSE:     Clinical information abd pain, hematuria      Comparison NONE.     FINDINGS:     Lower thorax: Parenchymal nodule in the left lung base on image 4  measuring 9 mm  6 mm parenchymal nodule in the right lung base  Large hiatal hernia     Abdomen:     Liver:  Low-attenuation lesion in the caudate lobe of the liver     Gallbladder: Surgically absent     Pancreas: Unremarkable. No mass or ductal dilatation.     Spleen: Homogeneous. No splenomegaly.     Adrenals: No mass.     Kidneys/ureters: Left-sided hydronephrosis and hydroureter to the level  of the urinary bladder where there appears to be a urinary bladder mass  extending into the distal left ureter.     GI tract: Moderate to large volume stool in the colon     Peritoneum: No free air. No free fluid or loculated fluid collections.     Mesentery: Unremarkable.     Lymph nodes: No lymphadenopathy.     Vasculature: No evidence of aneurysm.     Abdominal wall: No focal hernia or mass.        Pelvis:     Bladder: Abnormal urinary bladder mass which appears to also involve the  distal left ureter.     Reproductive: Unremarkable as visualized.     Appendix: No evidence of appendicitis     Bones: Scoliosis and arthritic change       Impression:         1. Urinary bladder mass and it appears to extend into the distal left  ureter. Recommend urologic follow-up  2. Low-attenuation lesion in the liver and nodules in the lungs. These  are concerning for metastatic disease  3. Other findings as above                 This report was finalized on 10/6/2020 11:29 AM by Dr. Barry Batista MD.       CT Chest Without Contrast [[id number]] Collected: 10/06/20 1129     Updated: 10/06/20 1133    Narrative:      EXAM: CT CHEST WO CONTRAST-            CLINICAL INDICATION:sob      COMPARISON: Chest x-ray 10/06/2020     TECHNIQUE: Multiple axial CT images were obtained from lung apex through  upper abdomen without administration of IV contrast. Reformatted images  in the coronal and/or sagittal plane(s) were generated from the axial  data set to facilitate diagnostic accuracy and/or surgical planning.     Radiation dose reduction techniques were utilized per ALARA protocol.  Automated exposure control was initiated through either or [employer]  or  [employer] software packages by  protocol.    DOSE (DLP mGy-cm):        FINDINGS:     Lungs: There are parenchymal nodules bilaterally concerning for  metastatic disease.  In addition, there are few groundglass opacities, predominantly in the  left lung, most likely concomitant pneumonia  Heart: Unremarkable.  Pericardium: No effusion.  Mediastinum: No masses. No enlarged lymph nodes.  No fluid collections.  Pleura: No pleural effusion. No pleural mass or abnormal calcification.  No pneumothorax.  Major airways: Clear. No intrinsic mass.  Vasculature: No evidence of aneurysm.   Visualized upper abdomen:Large hiatal hernia  Other: None.  Bones: No acute bony abnormality.       Impression:         1. Parenchymal nodules bilaterally concerning for metastatic disease.  Largest nodule is in the left upper lobe and measures 1.59 cm.  2. Few groundglass opacities primarily in the left lung but concerning  for concomitant pneumonia  3. Other findings as above        This report was finalized on 10/6/2020 11:31 AM by Dr. Barry Batista MD.       XR Chest 1 View [[id number]] Collected: 10/06/20 1043     Updated: 10/06/20 1046    Narrative:      XR CHEST 1 VW-     CLINICAL INDICATION: fatigue        COMPARISON: None available      TECHNIQUE: Single frontal view of the chest.     FINDINGS:     Left-sided consolidation concerning for pneumonia  The cardiac silhouette is normal. The pulmonary vasculature is  unremarkable.  There is no evidence of an acute osseous abnormality.   There are no suspicious-appearing parenchymal soft tissue nodules.          Impression:      Left-sided consolidation concerning for pneumonia     This report was finalized on 10/6/2020 10:44 AM by Dr. Barry Batista MD.       US Pelvis Complete [[id number]] Collected: 10/06/20 0940     Updated: 10/06/20 0943    Narrative:      EXAMINATION: US PELVIS COMPLETE-      CLINICAL INDICATION: pain        COMPARISON: None available     FINDINGS:  Transabdominal sonographic imaging of the pelvis shows uterus  measuring 9.30 x 3.21 cm.  Endometrium is 5.8 mm.  No complex uterine mass     There is what appears to be a mass within the bladder measuring 4.3 cm.  I would suggest cystoscopy.       Impression:      1. No complex uterine mass. Right ovary unable to be visualized. Left  ovary unremarkable.  2. Echogenic material within the urinary bladder. Recommend urologic  follow-up      This report was finalized on 10/6/2020 9:41 AM by Dr. Barry Batista MD.             Impression #1.-Bladder lesion likely metastatic bladder cancer for TURBT she has serious anemia    Electronically signed by Josiah Sabillon MD at 10/07/20 0740     Scheduled Meds Sorted by Name  for Rosaura Andujar as of 10/5/20 through 10/7/20    1 Day 3 Days 7 Days 10 Days <  Today >     Legend:                          Inactive     Active     Other Encounter     Linked                 Medications 10/05/20 10/06/20 10/07/20   amphetamine-dextroamphetamine (ADDERALL) tablet 30 mg   Dose: 30 mg  Freq: Every Morning Route: PO  Start: 10/07/20 0700    Admin Instructions:         (7632)   1022          azithromycin 500 MG/250 ML 0.9% NS IVPB (MBP)   Dose: 500 mg  Freq: Every 24 Hours Route: IV  Indications of Use: PNEUMONIA  Start: 10/07/20 1300 End: 10/10/20 1259    Admin Instructions:   Break seal and mix to activate vial before use      1020   1300          azithromycin 500 MG/250 ML 0.9% NS IVPB (MBP)   Dose: 500 mg  Freq: Once Route: IV  Indications of Use: SEPSIS  Start: 10/06/20 1140 End: 10/06/20 1418    Admin Instructions:   Break seal and mix to activate vial before use     [id number]     1445             cefTRIAXone (ROCEPHIN) 1 g/100 mL 0.9% NS (MBP)   Dose: 1 g  Freq: Every 24 Hours Route: IV  Indications of Use: PNEUMONIA  Start: 10/07/20 1400 End: 10/10/20 1359    Admin Instructions:   Caution: Look alike/sound alike drug alert. Break seal and mix to activiate vial before  use.      1029   1400          cefTRIAXone (ROCEPHIN) 2 g/100 mL 0.9% NS IVPB (MBP)   Dose: 2 g  Freq: Once Route: IV  Indications of Use: SEPSIS  Last Dose: Stopped (10/06/20 1318)  Start: 10/06/20 1140 End: 10/06/20 1318    Admin Instructions:   Caution: Look alike/sound alike drug alert. Break seal and mix to activiate vial before use.     8692   131           gabapentin (NEURONTIN) capsule 100 mg   Dose: 100 mg  Freq: Every 12 Hours Scheduled Route: PO  Start: 10/06/20 2200    Admin Instructions:        (8523) 4412 [C]   2467          gentamicin (GARAMYCIN) IVPB 80 mg   Dose: 80 mg  Freq: Once Route: IV  Indications of Use: BACTEREMIA,ENDOCARDITIS,SEPSIS,PERIOPERATIVE PHARMACOPROPHYLAXIS  Start: 10/07/20 1036 End: 10/07/20 1237    Admin Instructions:   Administer within 1 hour of surgical incision.      1237            lactobacillus acidophilus (RISAQUAD) capsule 1 capsule   Dose: 1 capsule  Freq: Daily Route: PO  Start: 10/07/20 1100      1029   1100          sodium chloride 0.9 % flush 10 mL   Dose: 10 mL  Freq: Every 12 Hours Scheduled Route: IV  Start: 10/07/20 1057      1057   2100          sodium chloride 0.9 % flush 10 mL   Dose: 10 mL  Freq: Every 12 Hours Scheduled Route: IV  Start: 10/06/20 1443     (8287)         0914   1026   2100        zolpidem (AMBIEN) tablet 5 mg   Dose: 5 mg  Freq: Nightly Route: PO  Start: 10/06/20 2200    Admin Instructions:        2147          1029   2100          Medications 10/05/20 10/06/20 10/07/20       Continuous Meds Sorted by Name  for Rosaura Andujar as of 10/5/20 through 10/7/20   Legend:                          Inactive     Active     Other Encounter     Linked                 Medications 10/05/20 10/06/20 10/07/20   lactated ringers infusion   Rate: 125 mL/hr Dose: 125 mL/hr  Freq: Continuous Route: IV  Start: 10/07/20 1059      1057                PRN Meds Sorted by Name  for Rosaura Andujar as of 10/5/20 through 10/7/20   Legend:                           Inactive     Active     Other Encounter     Linked                 Medications 10/05/20 10/06/20 10/07/20   midazolam (VERSED) injection 1 mg   Dose: 1 mg  Freq: Every 10 Minutes PRN Route: IV  PRN Reason: Anxiety  Indications of Use: ANXIETY  Start: 10/07/20 1055    Admin Instructions:   May repeat dose in 10 minutes x 1 then contact provider for additional orders.           sodium chloride 0.9 % flush 10 mL   Dose: 10 mL  Freq: As Needed Route: IV  PRN Reason: Line Care  Start: 10/07/20 1055         sodium chloride 0.9 % flush 10 mL   Dose: 10 mL  Freq: As Needed Route: IV  PRN Reason: Line Care  Start: 10/06/20 1441      1029            sodium chloride 0.9 % flush 10 mL   Dose: 10 mL  Freq: As Needed Route: IV  PRN Reason: Line Care  Start: 10/06/20 0849      1029                    Respiratory Therapy Notes (last 24 hours) (Notes from 10/06/20 1247 through 10/07/20 1247)    No notes exist for this encounter.

## 2020-10-08 VITALS
HEART RATE: 104 BPM | SYSTOLIC BLOOD PRESSURE: 142 MMHG | WEIGHT: 288.8 LBS | RESPIRATION RATE: 18 BRPM | HEIGHT: 66 IN | BODY MASS INDEX: 46.41 KG/M2 | DIASTOLIC BLOOD PRESSURE: 88 MMHG | OXYGEN SATURATION: 92 % | TEMPERATURE: 98.4 F

## 2020-10-08 LAB
ANION GAP SERPL CALCULATED.3IONS-SCNC: 11.3 MMOL/L (ref 5–15)
BUN SERPL-MCNC: 13 MG/DL (ref 8–23)
BUN/CREAT SERPL: 12.4 (ref 7–25)
CALCIUM SPEC-SCNC: 8.8 MG/DL (ref 8.6–10.5)
CHLORIDE SERPL-SCNC: 101 MMOL/L (ref 98–107)
CO2 SERPL-SCNC: 26.7 MMOL/L (ref 22–29)
CREAT SERPL-MCNC: 1.05 MG/DL (ref 0.57–1)
DEPRECATED RDW RBC AUTO: 50.1 FL (ref 37–54)
ERYTHROCYTE [DISTWIDTH] IN BLOOD BY AUTOMATED COUNT: 15.6 % (ref 12.3–15.4)
GFR SERPL CREATININE-BSD FRML MDRD: 52 ML/MIN/1.73
GLUCOSE SERPL-MCNC: 108 MG/DL (ref 65–99)
HCT VFR BLD AUTO: 30.9 % (ref 34–46.6)
HGB BLD-MCNC: 8.6 G/DL (ref 12–15.9)
MCH RBC QN AUTO: 24.7 PG (ref 26.6–33)
MCHC RBC AUTO-ENTMCNC: 27.8 G/DL (ref 31.5–35.7)
MCV RBC AUTO: 88.8 FL (ref 79–97)
PLATELET # BLD AUTO: 325 10*3/MM3 (ref 140–450)
PMV BLD AUTO: 9.5 FL (ref 6–12)
POTASSIUM SERPL-SCNC: 4.4 MMOL/L (ref 3.5–5.2)
RBC # BLD AUTO: 3.48 10*6/MM3 (ref 3.77–5.28)
SODIUM SERPL-SCNC: 139 MMOL/L (ref 136–145)
WBC # BLD AUTO: 8.94 10*3/MM3 (ref 3.4–10.8)

## 2020-10-08 PROCEDURE — 80048 BASIC METABOLIC PNL TOTAL CA: CPT | Performed by: INTERNAL MEDICINE

## 2020-10-08 PROCEDURE — 85027 COMPLETE CBC AUTOMATED: CPT | Performed by: INTERNAL MEDICINE

## 2020-10-08 PROCEDURE — 99239 HOSP IP/OBS DSCHRG MGMT >30: CPT | Performed by: INTERNAL MEDICINE

## 2020-10-08 RX ORDER — HYDROCODONE BITARTRATE AND ACETAMINOPHEN 5; 325 MG/1; MG/1
1 TABLET ORAL ONCE
Status: COMPLETED | OUTPATIENT
Start: 2020-10-08 | End: 2020-10-08

## 2020-10-08 RX ORDER — GABAPENTIN 100 MG/1
100 CAPSULE ORAL EVERY 12 HOURS SCHEDULED
Start: 2020-10-08

## 2020-10-08 RX ORDER — CEFDINIR 300 MG/1
300 CAPSULE ORAL 2 TIMES DAILY
Qty: 14 CAPSULE | Refills: 0 | Status: SHIPPED | OUTPATIENT
Start: 2020-10-08 | End: 2020-10-15

## 2020-10-08 RX ORDER — ACETAMINOPHEN 325 MG/1
650 TABLET ORAL EVERY 6 HOURS PRN
Status: DISCONTINUED | OUTPATIENT
Start: 2020-10-08 | End: 2020-10-08 | Stop reason: HOSPADM

## 2020-10-08 RX ADMIN — HYDROCODONE BITARTRATE AND ACETAMINOPHEN 1 TABLET: 5; 325 TABLET ORAL at 00:14

## 2020-10-08 RX ADMIN — SODIUM CHLORIDE 3000 ML: 900 IRRIGANT IRRIGATION at 06:17

## 2020-10-08 RX ADMIN — SODIUM CHLORIDE 3000 ML: 900 IRRIGANT IRRIGATION at 01:15

## 2020-10-08 RX ADMIN — ACETAMINOPHEN 650 MG: 325 TABLET ORAL at 02:41

## 2020-10-08 RX ADMIN — SODIUM CHLORIDE 3000 ML: 900 IRRIGANT IRRIGATION at 01:14

## 2020-10-08 RX ADMIN — SODIUM CHLORIDE, PRESERVATIVE FREE 10 ML: 5 INJECTION INTRAVENOUS at 08:42

## 2020-10-08 RX ADMIN — Medication 1 CAPSULE: at 08:40

## 2020-10-08 NOTE — DISCHARGE INSTR - APPOINTMENTS
Follow-up with Dr Gaytan on 10/13/20 at 1:45 pm. Office # [phone].    Your primary care Dr. Gaytan to schedule outpatient Liver biopsy and will notify you of appointment time.

## 2020-10-08 NOTE — PLAN OF CARE
Problem: Adult Inpatient Plan of Care  Goal: Plan of Care Review  Outcome: Ongoing, Progressing  Flowsheets  Taken 10/8/2020 0312 by Marcella Wasserman RN  Progress: no change  Outcome Summary: CBI infusing. Urine is clear at this time. Complaints of pain. 1X dose of pain medication administered. PRN tylenol ordered.  Taken 10/7/2020 1425 by Lindsey George RN  Plan of Care Reviewed With: patient  Goal: Patient-Specific Goal (Individualized)  Outcome: Ongoing, Progressing  Goal: Absence of Hospital-Acquired Illness or Injury  Outcome: Ongoing, Progressing  Intervention: Identify and Manage Fall Risk  Recent Flowsheet Documentation  Taken 10/8/2020 0300 by Marcella Wasserman RN  Safety Promotion/Fall Prevention: safety round/check completed  Taken 10/8/2020 0100 by Marcella Wasserman RN  Safety Promotion/Fall Prevention: safety round/check completed  Taken 10/7/2020 2300 by Marcella Wasserman RN  Safety Promotion/Fall Prevention: safety round/check completed  Taken 10/7/2020 2100 by Marcella Wasserman RN  Safety Promotion/Fall Prevention: safety round/check completed  Taken 10/7/2020 2025 by Marcella Wasserman RN  Safety Promotion/Fall Prevention: safety round/check completed  Taken 10/7/2020 1900 by Marcella Wasserman RN  Safety Promotion/Fall Prevention: safety round/check completed  Intervention: Prevent and Manage VTE (venous thromboembolism) Risk  Recent Flowsheet Documentation  Taken 10/7/2020 2025 by Marcella Wasserman RN  VTE Prevention/Management:   bilateral   sequential compression devices on  Goal: Optimal Comfort and Wellbeing  Outcome: Ongoing, Progressing  Intervention: Provide Person-Centered Care  Recent Flowsheet Documentation  Taken 10/7/2020 2025 by Marcella Wasserman RN  Trust Relationship/Rapport:   care explained   choices provided   emotional support provided   empathic listening provided   questions answered   reassurance provided   questions encouraged    thoughts/feelings acknowledged  Goal: Readiness for Transition of Care  Outcome: Ongoing, Progressing     Problem: Infection  Goal: Infection Symptom Resolution  Outcome: Ongoing, Progressing     Problem: Respiratory Compromise (Pneumonia)  Goal: Effective Oxygenation and Ventilation  Outcome: Ongoing, Progressing  Intervention: Promote Airway Secretion Clearance  Recent Flowsheet Documentation  Taken 10/7/2020 2025 by Marcella Wasserman RN  Cough And Deep Breathing: done independently per patient     Problem: Fall Injury Risk  Goal: Absence of Fall and Fall-Related Injury  Outcome: Ongoing, Progressing  Intervention: Promote Injury-Free Environment  Recent Flowsheet Documentation  Taken 10/8/2020 0300 by Marcella Wasserman RN  Safety Promotion/Fall Prevention: safety round/check completed  Taken 10/8/2020 0100 by Marcella Wasserman RN  Safety Promotion/Fall Prevention: safety round/check completed  Taken 10/7/2020 2300 by Marcella Wasserman RN  Safety Promotion/Fall Prevention: safety round/check completed  Taken 10/7/2020 2100 by Marcella Wasserman RN  Safety Promotion/Fall Prevention: safety round/check completed  Taken 10/7/2020 2025 by Marcella Wasserman RN  Safety Promotion/Fall Prevention: safety round/check completed  Taken 10/7/2020 1900 by Marcella Wasserman RN  Safety Promotion/Fall Prevention: safety round/check completed   Goal Outcome Evaluation:  Plan of Care Reviewed With: patient  Progress: no change  Outcome Summary: CBI infusing. Urine is clear at this time. Complaints of pain. 1X dose of pain medication administered. PRN tylenol ordered.

## 2020-10-08 NOTE — PROGRESS NOTES
Urology POD #1  That is post a TURBT of a very unusual appearing tumor.  We will have to wait the results.  Did not have the typical appearance of transitional cell neoplasia.  Her urine is crystal clear she is having minimal lower abdominal pain I would remove her catheter for today and await the results of the pathology report  Ramandeep

## 2020-10-08 NOTE — DISCHARGE SUMMARY
[facility] HOSPITALISTS DISCHARGE SUMMARY    Patient Identification:  Name:  Rosaura Andujar  Age:  70 y.o.  Sex:  female  :  1950  MRN:  [id number]  Visit Number:  [id number]    Date of Admission: 10/6/2020  Date of Discharge:  10/8/2020     PCP: Kobe Gaytan MD    DISCHARGE DIAGNOSIS     *Mild hypoxia at initial presentation, down to 88% on room air, suspected due to CAP, improved  *Anemia of chronic disease plus anemia of chronic blood loss due to persistent hematuria for 4 weeks  *CAP in the left upper lobe, negative COVID-19  *Painless hematuria with bladder tumor s/p resection  *Hydronephrosis and hydroureter due to above  *Mild leukocytosis due to above  *History of neuropathy, patient takes Lyrica 400 mg a day  *TERESO, resolved      HOSPITAL COURSE  Patient is a 70 y.o. female presented to [facility] complaining of hematuria and hypoxia.  Please see the admitting history and physical for further details.  The patient was found to have mild hypoxia at presentation with community-acquired pneumonia on top of metastatic disease that is newly diagnosed.  She was informed about these findings including her family member at the bedside.  Also she has been complaining of chronic hematuria for the past 4 weeks, had a cystoscopy and found to have a bladder tumor that was resected, the patient will need to follow-up with urology in the outpatient setting.  Her hematuria completely resolved, irrigation has been stopped.  Hemoglobin remained stable.  Patient will be discharged today in stable condition on oral antibiotics and was asked to follow-up with oncology and urology in the outpatient setting.  CT-guided biopsy order also was placed for outpatient setting.  She was found to have low attenuation mass in the liver as well.        VITAL SIGNS:  Temp:  [97.2 °F (36.2 °C)-98.4 °F (36.9 °C)] 98.4 °F (36.9 °C)  Heart Rate:  [] 104  Resp:  [15-21] 18  BP: (101-164)/(52-88)  142/88  SpO2:  [90 %-100 %] 92 %  on  Flow (L/min):  [2-4] 2;   Device (Oxygen Therapy): room air    Body mass index is 46.64 kg/m².  Wt Readings from Last 3 Encounters:   10/07/20 131 kg (288 lb 12.8 oz)       PHYSICAL EXAM:  Constitutional:  Well-developed and well-nourished.  Moderate respiratory distress especially when speaking full sentences   HENT:  Head: Normocephalic and atraumatic.  Mouth:  Moist mucous membranes.    Eyes:  Conjunctivae and EOM are normal.  Pupils are equal, round, and reactive to light.  No scleral icterus.  Neck:  Neck supple.  No JVD present.    Cardiovascular:  Normal rate, regular rhythm and normal heart sounds with no murmur.  Pulmonary/Chest:  N reduced air entry at the bases without any other added sounds, normal chest expansion.  Abdominal:  Soft.  Bowel sounds are normal.  No distension and no tenderness.   Musculoskeletal:  No edema, no tenderness, and no deformity.  No red or swollen joints anywhere.    Neurological:  No new deficit  Skin:  Skin is warm and dry.  No rash noted.  No pallor.   Peripheral vascular:  No edema and strong pulses on all 4 extremities.     DISCHARGE DISPOSITION   Stable    DISCHARGE MEDICATIONS:     Discharge Medications      New Medications      Instructions Start Date   cefdinir 300 MG capsule  Commonly known as: OMNICEF   300 mg, Oral, 2 Times Daily      gabapentin 100 MG capsule  Commonly known as: NEURONTIN  Replaces: pregabalin 200 MG capsule   100 mg, Oral, Every 12 Hours Scheduled         Continue These Medications      Instructions Start Date   Ambien 10 MG tablet  Generic drug: zolpidem   10 mg, Oral, Nightly      amphetamine-dextroamphetamine 30 MG tablet  Commonly known as: ADDERALL   30 mg, Oral, Every Morning      bisoprolol-hydrochlorothiazide 2.5-6.25 MG per tablet  Commonly known as: ZIAC   1 tablet, Oral, Daily         Stop These Medications    pregabalin 200 MG capsule  Commonly known as: LYRICA  Replaced by: gabapentin 100 MG  capsule              No future appointments.    Follow-up Information     Kobe Gaytan MD .    Specialty: Family Medicine  Contact information:  [address]  [address]  [place] [address]  [phone]             Josiah Sabillon MD Follow up in 1 week(s).    Specialty: Urology  Contact information:  [address]  [address]  [place] [address]  [phone]             Debbie Boykin MD Follow up in 1 week(s).    Specialty: Hematology  Contact information:  [address]  [place] [address]  [phone]                    TEST  RESULTS PENDING AT DISCHARGE  Pending Labs     Order Current Status    Tissue Pathology Exam In process    Blood Culture - Blood, Arm, Left Preliminary result    Blood Culture - Blood, Arm, Right Preliminary result           CODE STATUS  Code Status and Medical Interventions:   Ordered at: 10/06/20 1436     Code Status:    CPR     Medical Interventions (Level of Support Prior to Arrest):    Full       Mhd Fer Romero MD  10/08/20  12:23 EDT    Please note that this discharge summary required more than 30 minutes to complete.    Please send a copy of this dictation to the following providers:  Kobe Gaytan MD

## 2020-10-08 NOTE — DISCHARGE INSTR - ACTIVITY
No heavy lifting, more than 10 pounds, pushing, pulling or straining until released by Dr. Sabillon.

## 2020-10-08 NOTE — PLAN OF CARE
Goal Outcome Evaluation:  Plan of Care Reviewed With: patient  Progress: improving  Outcome Summary: Patient being being dischaged home today, urinating well, no acute changes

## 2020-10-08 NOTE — PROGRESS NOTES
Discharge Planning Assessment   [place]     Patient Name: Rosaura Andujar  MRN: [id number]  Today's Date: 10/8/2020    Admit Date: 10/6/2020    Discharge Needs Assessment     Row Name 10/08/20 4390       Living Environment    Lives With  spouse    Name(s) of Who Lives With Patient  Phyllis Andujar (spouse)  [phone]    Family Caregiver if Needed  spouse       Transition Planning    Patient/Family Anticipates Transition to  home with family    Transportation Anticipated  family or friend will provide       Discharge Needs Assessment    Readmission Within the Last 30 Days no previous admission in last 30 days    Equipment Currently Used at Home oxygen    Outpatient/Agency/Support Group Needs other (see comments) Pt says she and her spouse have , Maxine, through [employer] who checks on them regularly. Pt has spoken with Maxine since being admitted to hospital.        Discharge Plan     Row Name 10/08/20 2065       Plan    Plan Pt lives in [facility] ([address] in [place]) with her spouse, Phyllis Garrett ([phone]) and plans to return home with him at discharge.  She has oxygen that she uses at 3 lpm at night (does not have portable tanks) from [employer].  She does not have HH services. She has RA sat 92% this AM and says she will not need port tank for transport home at d/c.  She sees Dr Kobe Gaytan as her PCP.  She has [employer] and has , Maxine, through [employer] who calls her regularly.  She has spoken with Maxine since being admitted to hospital.  She gets her Rx filled at [facility]. Her spouse will provide transportation home at discharge.    Patient/Family in Agreement with Plan  yes    Plan Comments CM spoke with pt via phone.  She is POD # 1 s/p cysto/TURBT of large bladder mass.  Her melgoza has been removed and she says she is voiding OK.  She says she anticipates being d/c home today.  CM encouraged pt to utilize  [facility] for any questions she may have after discharge.  She denies other needs at this time.  CM will follow.     Demographic Summary     Row Name 10/08/20 1150       General Information    Admission Type  inpatient    Arrived From  emergency department    General Information Comments  Pt says she sees Dr Kobe Gaytan in [place] as her PCP.     Legal     Row Name 10/08/20 1150       Financial/Legal    Finance Comments  Pt has [employer] insurance and gets her Rx filled at [facility] in [place].     Kori Hernandez RN

## 2020-10-09 LAB
LAB AP CASE REPORT: NORMAL
PATH REPORT.FINAL DX SPEC: NORMAL

## 2020-10-09 NOTE — PROGRESS NOTES
Continued Stay Note  JAYME Luong     Patient Name: Rosaura Andujar  MRN: [id number]  Today's Date: 10/9/2020    Admit Date: 10/6/2020    Discharge Plan     Row Name 10/09/20 0600       Plan    Final Discharge Disposition Code  01 - home or self-care    Final Note  Pt was d/c home 10/8/2020 with PO abx and f/u appt for urology, oncology and PCP.  She denied any further needs.       Kori Hernandez RN

## 2020-10-10 ENCOUNTER — NURSE TRIAGE (OUTPATIENT)
Dept: CALL CENTER | Facility: HOSPITAL | Age: 70
End: 2020-10-10

## 2020-10-10 NOTE — TELEPHONE ENCOUNTER
"Caller has questions about her discharge instructions.  [facility] and [facility] records reviewed with caller and questions answered.    Reason for Disposition  • Health Information question, no triage required and triager able to answer question    Additional Information  • Negative: [1] Caller is not with the adult (patient) AND [2] reporting urgent symptoms  • Negative: Lab result questions  • Negative: Medication questions  • Negative: Caller can't be reached by phone  • Negative: Caller has already spoken to PCP or another triager  • Negative: RN needs further essential information from caller in order to complete triage  • Negative: Requesting regular office appointment  • Negative: [1] Caller requesting NON-URGENT health information AND [2] PCP's office is the best resource    Answer Assessment - Initial Assessment Questions  1. REASON FOR CALL or QUESTION: \"What is your reason for calling today?\" or \"How can I best help you?\" or \"What question do you have that I can help answer?\"      I some questions about my discharge instructions.    Protocols used: INFORMATION ONLY CALL - NO TRIAGE-ADULT-      "

## 2020-10-11 LAB
BACTERIA SPEC AEROBE CULT: NORMAL
BACTERIA SPEC AEROBE CULT: NORMAL

## 2020-10-22 NOTE — PROGRESS NOTES
Enter Query Response Below      Query Response:       Unable to determine  Electronically signed by Gayatri Romero MD, 10/22/20, 12:31 PM EDT.         If applicable, please update the problem list.        Patient: Rosaura Andujar        : 1950  Account: [id number]           Admit Date:          Options to Respond to Query:    1. Access the Encounter     a. From the To-Do Side bar, click Respond With New Note.     b. Answer query within the yellow box.                c. Update the Problem List if applicable.     Dr. Romero,     70 yr female noted with documentation of TERESO on discharge summary. No baseline creatinine noted in chart. On admit, creatinine-1.45 and decreased to 1.05. IV fluids given.     After study, do you feel the diagnosis of TERESO was clinically supported during this stay?    Yes  No  Other__  Unable to determine     By submitting this query, we are merely seeking further clarification of documentation to accurately reflect all conditions that you are monitoring, evaluating, treating or that extend the hospitalization or utilize additional resources of care. Please utilize your independent clinical judgment when addressing the question(s) above.     This query and your response, once completed, will be entered into the legal medical record.    Sincerely,  Tennille LAGUNA Rn Ext. [phone]  Clinical Documentation Integrity Program

## (undated) DEVICE — GLV SURG PREMIERPRO MIC LTX PF SZ8 BRN

## (undated) DEVICE — DRAINBAG,ANTI-REFLUX TOWER,L/F,2000ML,LL: Brand: MEDLINE

## (undated) DEVICE — SYRINGE,TOOMEY,IRRIGATION,70CC,STERILE: Brand: MEDLINE

## (undated) DEVICE — HF-RESECTION ELECTRODE PLASMALOOP LOOP, MEDIUM, 24 FR., 12°-30°, ESG TURIS: Brand: OLYMPUS

## (undated) DEVICE — COR CYSTO: Brand: MEDLINE INDUSTRIES, INC.

## (undated) DEVICE — CATH FOL BARDEX IC 3WY 22F 30CC

## (undated) DEVICE — EVAC BLDR ELLIK 1P/U

## (undated) DEVICE — Y-TYPE TUR/BLADDER IRRIGATION SET, REGULATING CLAMP